# Patient Record
Sex: FEMALE | Race: ASIAN | NOT HISPANIC OR LATINO | URBAN - METROPOLITAN AREA
[De-identification: names, ages, dates, MRNs, and addresses within clinical notes are randomized per-mention and may not be internally consistent; named-entity substitution may affect disease eponyms.]

---

## 2017-05-29 ENCOUNTER — EMERGENCY (EMERGENCY)
Facility: HOSPITAL | Age: 32
LOS: 1 days | Discharge: HOME | End: 2017-05-29
Attending: EMERGENCY MEDICINE | Admitting: EMERGENCY MEDICINE
Payer: COMMERCIAL

## 2017-05-29 VITALS
TEMPERATURE: 98 F | SYSTOLIC BLOOD PRESSURE: 123 MMHG | RESPIRATION RATE: 18 BRPM | DIASTOLIC BLOOD PRESSURE: 81 MMHG | OXYGEN SATURATION: 99 % | HEART RATE: 77 BPM

## 2017-05-29 VITALS
RESPIRATION RATE: 17 BRPM | TEMPERATURE: 98 F | OXYGEN SATURATION: 97 % | HEART RATE: 69 BPM | DIASTOLIC BLOOD PRESSURE: 79 MMHG | SYSTOLIC BLOOD PRESSURE: 117 MMHG

## 2017-05-29 DIAGNOSIS — O20.9 HEMORRHAGE IN EARLY PREGNANCY, UNSPECIFIED: ICD-10-CM

## 2017-05-29 LAB
ALBUMIN SERPL ELPH-MCNC: 4.6 G/DL — SIGNIFICANT CHANGE UP (ref 3.3–5)
ALP SERPL-CCNC: 64 U/L — SIGNIFICANT CHANGE UP (ref 40–120)
ALT FLD-CCNC: 12 U/L RC — SIGNIFICANT CHANGE UP (ref 10–45)
ANION GAP SERPL CALC-SCNC: 14 MMOL/L — SIGNIFICANT CHANGE UP (ref 5–17)
APPEARANCE UR: CLEAR — SIGNIFICANT CHANGE UP
APTT BLD: 30.9 SEC — SIGNIFICANT CHANGE UP (ref 27.5–37.4)
AST SERPL-CCNC: 16 U/L — SIGNIFICANT CHANGE UP (ref 10–40)
BACTERIA # UR AUTO: ABNORMAL /HPF
BASOPHILS # BLD AUTO: 0 K/UL — SIGNIFICANT CHANGE UP (ref 0–0.2)
BASOPHILS NFR BLD AUTO: 0.1 % — SIGNIFICANT CHANGE UP (ref 0–2)
BILIRUB SERPL-MCNC: 0.4 MG/DL — SIGNIFICANT CHANGE UP (ref 0.2–1.2)
BILIRUB UR-MCNC: NEGATIVE — SIGNIFICANT CHANGE UP
BLD GP AB SCN SERPL QL: NEGATIVE — SIGNIFICANT CHANGE UP
BUN SERPL-MCNC: 12 MG/DL — SIGNIFICANT CHANGE UP (ref 7–23)
CALCIUM SERPL-MCNC: 9.6 MG/DL — SIGNIFICANT CHANGE UP (ref 8.4–10.5)
CHLORIDE SERPL-SCNC: 102 MMOL/L — SIGNIFICANT CHANGE UP (ref 96–108)
CO2 SERPL-SCNC: 25 MMOL/L — SIGNIFICANT CHANGE UP (ref 22–31)
COLOR SPEC: YELLOW — SIGNIFICANT CHANGE UP
CREAT SERPL-MCNC: 0.78 MG/DL — SIGNIFICANT CHANGE UP (ref 0.5–1.3)
DIFF PNL FLD: ABNORMAL
EOSINOPHIL # BLD AUTO: 0.1 K/UL — SIGNIFICANT CHANGE UP (ref 0–0.5)
EOSINOPHIL NFR BLD AUTO: 0.7 % — SIGNIFICANT CHANGE UP (ref 0–6)
EPI CELLS # UR: SIGNIFICANT CHANGE UP /HPF
GLUCOSE SERPL-MCNC: 94 MG/DL — SIGNIFICANT CHANGE UP (ref 70–99)
GLUCOSE UR QL: NEGATIVE — SIGNIFICANT CHANGE UP
HCG SERPL-ACNC: 23.2 MIU/ML — SIGNIFICANT CHANGE UP
HCT VFR BLD CALC: 46.1 % — HIGH (ref 34.5–45)
HGB BLD-MCNC: 15.4 G/DL — SIGNIFICANT CHANGE UP (ref 11.5–15.5)
INR BLD: 1.14 RATIO — SIGNIFICANT CHANGE UP (ref 0.88–1.16)
KETONES UR-MCNC: ABNORMAL
LEUKOCYTE ESTERASE UR-ACNC: NEGATIVE — SIGNIFICANT CHANGE UP
LYMPHOCYTES # BLD AUTO: 29.9 % — SIGNIFICANT CHANGE UP (ref 13–44)
LYMPHOCYTES # BLD AUTO: 3.2 K/UL — SIGNIFICANT CHANGE UP (ref 1–3.3)
MCHC RBC-ENTMCNC: 30.3 PG — SIGNIFICANT CHANGE UP (ref 27–34)
MCHC RBC-ENTMCNC: 33.5 GM/DL — SIGNIFICANT CHANGE UP (ref 32–36)
MCV RBC AUTO: 90.5 FL — SIGNIFICANT CHANGE UP (ref 80–100)
MONOCYTES # BLD AUTO: 0.8 K/UL — SIGNIFICANT CHANGE UP (ref 0–0.9)
MONOCYTES NFR BLD AUTO: 6.9 % — SIGNIFICANT CHANGE UP (ref 2–14)
NEUTROPHILS # BLD AUTO: 6.8 K/UL — SIGNIFICANT CHANGE UP (ref 1.8–7.4)
NEUTROPHILS NFR BLD AUTO: 62.4 % — SIGNIFICANT CHANGE UP (ref 43–77)
NITRITE UR-MCNC: NEGATIVE — SIGNIFICANT CHANGE UP
PH UR: 5.5 — SIGNIFICANT CHANGE UP (ref 5–8)
PLATELET # BLD AUTO: 281 K/UL — SIGNIFICANT CHANGE UP (ref 150–400)
POTASSIUM SERPL-MCNC: 4.2 MMOL/L — SIGNIFICANT CHANGE UP (ref 3.5–5.3)
POTASSIUM SERPL-SCNC: 4.2 MMOL/L — SIGNIFICANT CHANGE UP (ref 3.5–5.3)
PROT SERPL-MCNC: 7.8 G/DL — SIGNIFICANT CHANGE UP (ref 6–8.3)
PROT UR-MCNC: SIGNIFICANT CHANGE UP
PROTHROM AB SERPL-ACNC: 12.4 SEC — SIGNIFICANT CHANGE UP (ref 9.8–12.7)
RBC # BLD: 5.09 M/UL — SIGNIFICANT CHANGE UP (ref 3.8–5.2)
RBC # FLD: 11.8 % — SIGNIFICANT CHANGE UP (ref 10.3–14.5)
RBC CASTS # UR COMP ASSIST: SIGNIFICANT CHANGE UP /HPF (ref 0–2)
RH IG SCN BLD-IMP: POSITIVE — SIGNIFICANT CHANGE UP
SODIUM SERPL-SCNC: 141 MMOL/L — SIGNIFICANT CHANGE UP (ref 135–145)
SP GR SPEC: 1.03 — HIGH (ref 1.01–1.02)
UROBILINOGEN FLD QL: NEGATIVE — SIGNIFICANT CHANGE UP
WBC # BLD: 10.9 K/UL — HIGH (ref 3.8–10.5)
WBC # FLD AUTO: 10.9 K/UL — HIGH (ref 3.8–10.5)
WBC UR QL: SIGNIFICANT CHANGE UP /HPF (ref 0–5)

## 2017-05-29 PROCEDURE — 85610 PROTHROMBIN TIME: CPT

## 2017-05-29 PROCEDURE — 76815 OB US LIMITED FETUS(S): CPT

## 2017-05-29 PROCEDURE — 76815 OB US LIMITED FETUS(S): CPT | Mod: 26

## 2017-05-29 PROCEDURE — 81001 URINALYSIS AUTO W/SCOPE: CPT

## 2017-05-29 PROCEDURE — 99284 EMERGENCY DEPT VISIT MOD MDM: CPT | Mod: 25

## 2017-05-29 PROCEDURE — 86850 RBC ANTIBODY SCREEN: CPT

## 2017-05-29 PROCEDURE — 85730 THROMBOPLASTIN TIME PARTIAL: CPT

## 2017-05-29 PROCEDURE — 85027 COMPLETE CBC AUTOMATED: CPT

## 2017-05-29 PROCEDURE — 99285 EMERGENCY DEPT VISIT HI MDM: CPT

## 2017-05-29 PROCEDURE — 76817 TRANSVAGINAL US OBSTETRIC: CPT | Mod: 26

## 2017-05-29 PROCEDURE — 80053 COMPREHEN METABOLIC PANEL: CPT

## 2017-05-29 PROCEDURE — 86901 BLOOD TYPING SEROLOGIC RH(D): CPT

## 2017-05-29 PROCEDURE — 76817 TRANSVAGINAL US OBSTETRIC: CPT

## 2017-05-29 PROCEDURE — 86900 BLOOD TYPING SEROLOGIC ABO: CPT

## 2017-05-29 PROCEDURE — 84702 CHORIONIC GONADOTROPIN TEST: CPT

## 2017-05-29 NOTE — ED PROVIDER NOTE - MEDICAL DECISION MAKING DETAILS
31 year old female,  past medical history septoplasty, presents to the ED for vaginal bleeding for 1 week. Mild abdominal cramping. Check labwork, HCG, transvaginal, UA, reeval.

## 2017-05-29 NOTE — ED PROVIDER NOTE - PROGRESS NOTE DETAILS
Attending MD Cole: Patient re-evaluated and doing well.  No acute issues at  this time.  Lab and radiology tests reviewed with patient and boyfriend.  Patient stable for discharge. Emphasized importance of repeat HCG and U/S in 48hrs.  Explained ectopic vs spontaneous  vs early pregnancy.  Verbalized understanding. Follow up instructions given, importance of follow up emphasized, return to ED parameters reviewed and patient verbalized understanding.  All questions answered, all concerns addressed. Patient reports she will return to Dr. Cowan in 48hrs.

## 2017-05-29 NOTE — ED PROVIDER NOTE - ATTENDING CONTRIBUTION TO CARE
Attending MD Cole: I personally have seen and examined this patient.  Resident note reviewed and agree on plan of care and except where noted.  See below for details.     31F with no reported PMH and PSH septoplasty presents to the ED with vaginal spotting after +ur preg, LMP 4/22/17.  Reports has been having vaginal bleeding with urinary frequency, mild cramping and nausea for about a week.  Reports spotting is light pink and uses a pantyliner a day.  Reports mild abdominal cramping in lower abdomen.  Denies alleviating/aggravating factor. Denies fevers, chills, dizziness, weakness, change in vision. Denies vomiting, diarrhea, blood in stools. Denies dysuria, hematuria, frequency but reports urgency. PMD Dr. Cowan.  On exam, head NCAT, PERRL, FROM at neck, no tenderness to palpation or stepoffs along length of spine, lungs CTAB with good inspiratory effort, +S1S2, no m/r/g, abdomen soft with +BS, NT, ND, no CVAT, pelvic exam scant blood in vaginal vault, os closed, no tenderness to adnexa on bimanual, no CMT, moving all extremities with 5/5 strength bilateral upper and lower extremities, good and equal  strength bilaterally; Plan: Labs, UrHCG, RH, U/S, reassess

## 2017-05-29 NOTE — ED ADULT NURSE NOTE - OBJECTIVE STATEMENT
32 y/o F, A&Ox3, enters ED w/ c/o vaginal bleeding for the past few days. Pt. had an  at age 19, has not had any pregnancies since. LMP about a month ago. Pt. reports spotting of bright pink blood and brown blood. Denies clots. C/o fatigue, nausea and dizziness. Also reports sore throat. Pt. reports she has been around family that is sick. Denies fever/chills. Skin warm, dry & intact. Family at bedside. 30 y/o F, A&Ox3, enters ED w/ c/o vaginal bleeding for the past few days. Pt. had an  at age 19, has not had any pregnancies since. LMP about a month ago. Pt. states she took 2 pregnancy tests and both were positive. Pt. reports spotting of bright pink blood and brown blood. Denies clots. C/o fatigue, nausea and dizziness. Also reports sore throat. Pt. reports she has been around family that is sick. Denies fever/chills. Skin warm, dry & intact. Family at bedside.

## 2017-05-29 NOTE — ED PROVIDER NOTE - NONTENDER LOCATION
left upper quadrant/right costovertebral angle/right upper quadrant/left costovertebral angle/left lower quadrant/right lower quadrant

## 2017-05-29 NOTE — ED PROVIDER NOTE - OBJECTIVE STATEMENT
31 year old female,  past medical history septoplasty, presents to the ED for vaginal bleeding for 1 week. Reports light pink spotting, does not even go through 1 pad a day.  Patient reports that she may be pregnant, positive home pregnancy test. Reports mild cramping, suprapubic, non-radiating, no aggravating or relieving factors. No fevers or chills. Nausea without vomiting.  No dysuria but reports increased frequency.     LMP: 2017  primary medical doctor: Dr. Cowan

## 2017-07-31 ENCOUNTER — EMERGENCY (EMERGENCY)
Facility: HOSPITAL | Age: 32
LOS: 1 days | Discharge: HOME | End: 2017-07-31
Attending: EMERGENCY MEDICINE | Admitting: EMERGENCY MEDICINE
Payer: COMMERCIAL

## 2017-07-31 VITALS
HEART RATE: 64 BPM | SYSTOLIC BLOOD PRESSURE: 120 MMHG | RESPIRATION RATE: 20 BRPM | DIASTOLIC BLOOD PRESSURE: 85 MMHG | OXYGEN SATURATION: 100 % | TEMPERATURE: 98 F

## 2017-07-31 DIAGNOSIS — Z98.890 OTHER SPECIFIED POSTPROCEDURAL STATES: Chronic | ICD-10-CM

## 2017-07-31 PROCEDURE — 99283 EMERGENCY DEPT VISIT LOW MDM: CPT

## 2017-07-31 PROCEDURE — 99283 EMERGENCY DEPT VISIT LOW MDM: CPT | Mod: 25

## 2017-07-31 PROCEDURE — 73562 X-RAY EXAM OF KNEE 3: CPT | Mod: 26,LT

## 2017-07-31 PROCEDURE — 73562 X-RAY EXAM OF KNEE 3: CPT

## 2017-07-31 NOTE — ED PROVIDER NOTE - MEDICAL DECISION MAKING DETAILS
32 y/o F w/ pmh p/w increasing L knee pain and joint instability s/p fall. 32 y/o F w/ pmh p/w increasing L knee pain and joint instability s/p fall.  Att yo female presents with left knee pain x 3 days s/p mechanical fall; taking ibuprofen at home; no fevers; no cp/sob; on exam left lateral knee ttp; no neurovascular deficits; xray negative for fracture; pt has her own knee immobilizer which she reapplied; will f/u with ortho as outpatient; nsaids for pain

## 2017-07-31 NOTE — ED PROVIDER NOTE - ATTENDING CONTRIBUTION TO CARE
Att yo female presents with left knee pain x 3 days s/p mechanical fall; taking ibuprofen at home; no fevers; no cp/sob; on exam left lateral knee ttp; no neurovascular deficits; xray negative for fracture; pt has her own knee immobilizer which she reapplied; will f/u with ortho as outpatient; nsaids for pain

## 2017-07-31 NOTE — ED PROVIDER NOTE - OBJECTIVE STATEMENT
30 yo F w/out pmh p/w L knee pain and inability to ambulate s/p mechanical fall 3 days ago. L foot got caught, pt fell onto buttocks. Pt went to [facility] ED immediately after (got XR), was referred to ortho clinic, but was unable to get appointment. Pain is "internal" L knee, w/ pain radiating up L thigh. Pain has increased from 5/10 3 days ago, to 9/10 today. Pain is mildly decreased w/ ibuprofen 600. Pain increases on wt bearing. 30 yo F w/out pmh p/w L knee pain and inability to ambulate s/p mechanical fall 3 days ago. L foot got caught, pt fell onto buttocks, reports 'feeling' L knee lateral ligament tear. Pt went to [facility] ED immediately after (got XR that was read as normal), was referred to ortho clinic, but was unable to get appointment. Pain is "internal" L knee, w/ pain radiating up L thigh. Pain has increased from 5/10 3 days ago, to 9/10 today. Pain is mildly decreased w/ ibuprofen 600. Pain increases on wt bearing.

## 2017-08-04 ENCOUNTER — APPOINTMENT (OUTPATIENT)
Dept: ORTHOPEDIC SURGERY | Facility: CLINIC | Age: 32
End: 2017-08-04
Payer: COMMERCIAL

## 2017-08-04 VITALS — SYSTOLIC BLOOD PRESSURE: 106 MMHG | DIASTOLIC BLOOD PRESSURE: 71 MMHG | HEART RATE: 64 BPM

## 2017-08-04 VITALS — BODY MASS INDEX: 25.71 KG/M2 | WEIGHT: 160 LBS | HEIGHT: 66 IN

## 2017-08-04 DIAGNOSIS — Z78.9 OTHER SPECIFIED HEALTH STATUS: ICD-10-CM

## 2017-08-04 DIAGNOSIS — S83.8X2A SPRAIN OF OTHER SPECIFIED PARTS OF LEFT KNEE, INITIAL ENCOUNTER: ICD-10-CM

## 2017-08-04 DIAGNOSIS — Z87.891 PERSONAL HISTORY OF NICOTINE DEPENDENCE: ICD-10-CM

## 2017-08-04 DIAGNOSIS — Z56.0 UNEMPLOYMENT, UNSPECIFIED: ICD-10-CM

## 2017-08-04 PROCEDURE — 99204 OFFICE O/P NEW MOD 45 MIN: CPT

## 2017-08-04 PROCEDURE — 73562 X-RAY EXAM OF KNEE 3: CPT | Mod: LT

## 2017-08-04 SDOH — ECONOMIC STABILITY - INCOME SECURITY: UNEMPLOYMENT, UNSPECIFIED: Z56.0

## 2017-08-08 PROBLEM — Z78.9 OCCASIONAL RECREATIONAL DRUG USE: Status: ACTIVE | Noted: 2017-08-04

## 2017-08-08 PROBLEM — Z87.891 FORMER SMOKER: Status: ACTIVE | Noted: 2017-08-04

## 2017-08-08 PROBLEM — Z78.9 EXERCISES OCCASIONALLY: Status: ACTIVE | Noted: 2017-08-04

## 2017-08-08 PROBLEM — Z56.0 UNEMPLOYED: Status: ACTIVE | Noted: 2017-08-04

## 2017-08-11 ENCOUNTER — APPOINTMENT (OUTPATIENT)
Dept: ORTHOPEDIC SURGERY | Facility: CLINIC | Age: 32
End: 2017-08-11

## 2018-02-01 ENCOUNTER — OUTPATIENT (OUTPATIENT)
Dept: OUTPATIENT SERVICES | Facility: HOSPITAL | Age: 33
LOS: 1 days | End: 2018-02-01
Payer: MEDICAID

## 2018-02-01 DIAGNOSIS — Z98.890 OTHER SPECIFIED POSTPROCEDURAL STATES: Chronic | ICD-10-CM

## 2018-02-01 PROCEDURE — G9001: CPT

## 2018-02-15 ENCOUNTER — EMERGENCY (EMERGENCY)
Facility: HOSPITAL | Age: 33
LOS: 1 days | Discharge: HOME | End: 2018-02-15
Attending: EMERGENCY MEDICINE | Admitting: EMERGENCY MEDICINE
Payer: MEDICAID

## 2018-02-15 VITALS
HEART RATE: 78 BPM | DIASTOLIC BLOOD PRESSURE: 78 MMHG | RESPIRATION RATE: 16 BRPM | OXYGEN SATURATION: 99 % | SYSTOLIC BLOOD PRESSURE: 118 MMHG | TEMPERATURE: 98 F

## 2018-02-15 DIAGNOSIS — Z98.890 OTHER SPECIFIED POSTPROCEDURAL STATES: Chronic | ICD-10-CM

## 2018-02-15 PROCEDURE — 99053 MED SERV 10PM-8AM 24 HR FAC: CPT

## 2018-02-15 PROCEDURE — 99284 EMERGENCY DEPT VISIT MOD MDM: CPT | Mod: 25

## 2018-02-15 NOTE — ED ADULT NURSE NOTE - OBJECTIVE STATEMENT
33 y/o female presents to the ED ambulatory with steady gait. A&Ox3. C/O lower abdominal pain "cramping" starting today. Pt. states confirmed pregnancy with home pregnancy test x3 times. Pt was seen in the ED in may for spontaneous miscarriage. Pt had surgical  when she was 20y/o. G3PO. Pt denies vaginal bleeding at this time. +easy work of breathing, clear lung sounds. abdomen soft, nontender, and nondistended. full range of motion of all extremities. no edema present. LMP . Pt. states she started oral contraceptives in December and discontinued in January. Pt denies cough, chills, fever, chest pain, n/v/d, or numbness and tingling.

## 2018-02-16 VITALS
DIASTOLIC BLOOD PRESSURE: 78 MMHG | RESPIRATION RATE: 16 BRPM | HEART RATE: 72 BPM | OXYGEN SATURATION: 98 % | SYSTOLIC BLOOD PRESSURE: 108 MMHG

## 2018-02-16 DIAGNOSIS — Z34.90 ENCOUNTER FOR SUPERVISION OF NORMAL PREGNANCY, UNSPECIFIED, UNSPECIFIED TRIMESTER: ICD-10-CM

## 2018-02-16 LAB
ALBUMIN SERPL ELPH-MCNC: 3.8 G/DL — SIGNIFICANT CHANGE UP (ref 3.3–5)
ALP SERPL-CCNC: 50 U/L — SIGNIFICANT CHANGE UP (ref 40–120)
ALT FLD-CCNC: 14 U/L RC — SIGNIFICANT CHANGE UP (ref 10–45)
ANION GAP SERPL CALC-SCNC: 14 MMOL/L — SIGNIFICANT CHANGE UP (ref 5–17)
APPEARANCE UR: CLEAR — SIGNIFICANT CHANGE UP
APTT BLD: 28.1 SEC — SIGNIFICANT CHANGE UP (ref 27.5–37.4)
AST SERPL-CCNC: 16 U/L — SIGNIFICANT CHANGE UP (ref 10–40)
BASOPHILS # BLD AUTO: 0.1 K/UL — SIGNIFICANT CHANGE UP (ref 0–0.2)
BASOPHILS NFR BLD AUTO: 0.6 % — SIGNIFICANT CHANGE UP (ref 0–2)
BILIRUB SERPL-MCNC: 0.2 MG/DL — SIGNIFICANT CHANGE UP (ref 0.2–1.2)
BILIRUB UR-MCNC: NEGATIVE — SIGNIFICANT CHANGE UP
BLD GP AB SCN SERPL QL: NEGATIVE — SIGNIFICANT CHANGE UP
BUN SERPL-MCNC: 12 MG/DL — SIGNIFICANT CHANGE UP (ref 7–23)
CALCIUM SERPL-MCNC: 9.2 MG/DL — SIGNIFICANT CHANGE UP (ref 8.4–10.5)
CHLORIDE SERPL-SCNC: 102 MMOL/L — SIGNIFICANT CHANGE UP (ref 96–108)
CO2 SERPL-SCNC: 22 MMOL/L — SIGNIFICANT CHANGE UP (ref 22–31)
COLOR SPEC: YELLOW — SIGNIFICANT CHANGE UP
CREAT SERPL-MCNC: 0.62 MG/DL — SIGNIFICANT CHANGE UP (ref 0.5–1.3)
DIFF PNL FLD: NEGATIVE — SIGNIFICANT CHANGE UP
EOSINOPHIL # BLD AUTO: 0.2 K/UL — SIGNIFICANT CHANGE UP (ref 0–0.5)
EOSINOPHIL NFR BLD AUTO: 1.8 % — SIGNIFICANT CHANGE UP (ref 0–6)
EPI CELLS # UR: SIGNIFICANT CHANGE UP /HPF
GLUCOSE SERPL-MCNC: 92 MG/DL — SIGNIFICANT CHANGE UP (ref 70–99)
GLUCOSE UR QL: NEGATIVE — SIGNIFICANT CHANGE UP
HCG SERPL-ACNC: 810.4 MIU/ML — HIGH (ref 5–24)
HCT VFR BLD CALC: 40.8 % — SIGNIFICANT CHANGE UP (ref 34.5–45)
HGB BLD-MCNC: 13.7 G/DL — SIGNIFICANT CHANGE UP (ref 11.5–15.5)
INR BLD: 0.99 RATIO — SIGNIFICANT CHANGE UP (ref 0.88–1.16)
KETONES UR-MCNC: NEGATIVE — SIGNIFICANT CHANGE UP
LEUKOCYTE ESTERASE UR-ACNC: NEGATIVE — SIGNIFICANT CHANGE UP
LYMPHOCYTES # BLD AUTO: 33.9 % — SIGNIFICANT CHANGE UP (ref 13–44)
LYMPHOCYTES # BLD AUTO: 4 K/UL — HIGH (ref 1–3.3)
MCHC RBC-ENTMCNC: 29.9 PG — SIGNIFICANT CHANGE UP (ref 27–34)
MCHC RBC-ENTMCNC: 33.5 GM/DL — SIGNIFICANT CHANGE UP (ref 32–36)
MCV RBC AUTO: 89.4 FL — SIGNIFICANT CHANGE UP (ref 80–100)
MONOCYTES # BLD AUTO: 0.8 K/UL — SIGNIFICANT CHANGE UP (ref 0–0.9)
MONOCYTES NFR BLD AUTO: 6.5 % — SIGNIFICANT CHANGE UP (ref 2–14)
NEUTROPHILS # BLD AUTO: 6.7 K/UL — SIGNIFICANT CHANGE UP (ref 1.8–7.4)
NEUTROPHILS NFR BLD AUTO: 57.2 % — SIGNIFICANT CHANGE UP (ref 43–77)
NITRITE UR-MCNC: NEGATIVE — SIGNIFICANT CHANGE UP
PH UR: 6 — SIGNIFICANT CHANGE UP (ref 5–8)
PLATELET # BLD AUTO: 285 K/UL — SIGNIFICANT CHANGE UP (ref 150–400)
POTASSIUM SERPL-MCNC: 4 MMOL/L — SIGNIFICANT CHANGE UP (ref 3.5–5.3)
POTASSIUM SERPL-SCNC: 4 MMOL/L — SIGNIFICANT CHANGE UP (ref 3.5–5.3)
PROT SERPL-MCNC: 7 G/DL — SIGNIFICANT CHANGE UP (ref 6–8.3)
PROT UR-MCNC: NEGATIVE — SIGNIFICANT CHANGE UP
PROTHROM AB SERPL-ACNC: 10.8 SEC — SIGNIFICANT CHANGE UP (ref 9.8–12.7)
RBC # BLD: 4.56 M/UL — SIGNIFICANT CHANGE UP (ref 3.8–5.2)
RBC # FLD: 11.6 % — SIGNIFICANT CHANGE UP (ref 10.3–14.5)
RBC CASTS # UR COMP ASSIST: SIGNIFICANT CHANGE UP /HPF (ref 0–2)
RH IG SCN BLD-IMP: POSITIVE — SIGNIFICANT CHANGE UP
SODIUM SERPL-SCNC: 138 MMOL/L — SIGNIFICANT CHANGE UP (ref 135–145)
SP GR SPEC: 1.02 — SIGNIFICANT CHANGE UP (ref 1.01–1.02)
UROBILINOGEN FLD QL: NEGATIVE — SIGNIFICANT CHANGE UP
WBC # BLD: 11.7 K/UL — HIGH (ref 3.8–10.5)
WBC # FLD AUTO: 11.7 K/UL — HIGH (ref 3.8–10.5)
WBC UR QL: SIGNIFICANT CHANGE UP /HPF (ref 0–5)

## 2018-02-16 PROCEDURE — 76815 OB US LIMITED FETUS(S): CPT | Mod: 26

## 2018-02-16 PROCEDURE — 85610 PROTHROMBIN TIME: CPT

## 2018-02-16 PROCEDURE — 80053 COMPREHEN METABOLIC PANEL: CPT

## 2018-02-16 PROCEDURE — 99283 EMERGENCY DEPT VISIT LOW MDM: CPT

## 2018-02-16 PROCEDURE — 86850 RBC ANTIBODY SCREEN: CPT

## 2018-02-16 PROCEDURE — 85027 COMPLETE CBC AUTOMATED: CPT

## 2018-02-16 PROCEDURE — 85730 THROMBOPLASTIN TIME PARTIAL: CPT

## 2018-02-16 PROCEDURE — 86901 BLOOD TYPING SEROLOGIC RH(D): CPT

## 2018-02-16 PROCEDURE — 76802 OB US < 14 WKS ADDL FETUS: CPT

## 2018-02-16 PROCEDURE — 87086 URINE CULTURE/COLONY COUNT: CPT

## 2018-02-16 PROCEDURE — 81001 URINALYSIS AUTO W/SCOPE: CPT

## 2018-02-16 PROCEDURE — 84702 CHORIONIC GONADOTROPIN TEST: CPT

## 2018-02-16 PROCEDURE — 86900 BLOOD TYPING SEROLOGIC ABO: CPT

## 2018-02-16 NOTE — ED PROVIDER NOTE - ATTENDING CONTRIBUTION TO CARE
32 y.o. female  at approx 4 weeks by dates pw lower ab cramping with abd soft, nt.  threatened ab work up.  eating soup on arrival, abd soft, nt but h/o miscarriage.

## 2018-02-16 NOTE — ED PROVIDER NOTE - MEDICAL DECISION MAKING DETAILS
32 y.o. female  at approx 4 weeks by dates pw lower ab cramping and vaginal spotting. Will check labs, UA, type and screen, US, HCG, reevaluate.

## 2018-02-16 NOTE — ED PROVIDER NOTE - PHYSICAL EXAMINATION
Ongoing issue. Patient reports 100% compliance with amlodipine 5 mg, losartan 50 mg and hydrochlorothiazide 12.5 mg. Patient denies any issues with headache, dizziness, chest pain.       Gen: Well appearing, NAD  Head: NCAT  HEENT: MMM, Normal conjunctiva  Lung: CTAB, no rales, rhonchi or wheezing  CV: RRR, no murmurs, rubs or gallops  Abd: soft, +lower ab tenderness, no rebound or guarding  MSK: No CVA tenderness. No edema, no visible deformities  Neuro: No focal neurologic deficits.   Skin: Warm and dry, no evidence of rash  Psych: normal mood and affect, A&Ox3

## 2018-02-16 NOTE — CONSULT NOTE ADULT - SUBJECTIVE AND OBJECTIVE BOX
HPI:    32y , LMP 2018      presents with       OBHx:  GynHx:   PMH:  PSH:  All:  Meds:   SocialHx:     Vital Signs Last 24 Hrs  T(C): 36.8 (2018 00:52), Max: 36.8 (2018 00:52)  T(F): 98.2 (2018 00:52), Max: 98.2 (2018 00:52)  HR: 74 (2018 00:52) (74 - 78)  BP: 110/76 (2018 00:52) (110/76 - 118/78)  BP(mean): --  RR: 16 (2018 00:52) (16 - 16)  SpO2: 96% (2018 00:52) (96% - 99%)    PE:  Gen: Comfortable, NAD  CV: RRR  Pulm: CTAB  Abd: Soft, NT  Ext: No edema or tenderness bilaterally  Spec Exam:    LABS:                        13.7   11.7  )-----------( 285      ( 2018 02:25 )             40.8     02-    138  |  102  |  12  ----------------------------<  92  4.0   |  22  |  0.62    Ca    9.2      2018 02:25    TPro  7.0  /  Alb  3.8  /  TBili  0.2  /  DBili  x   /  AST  16  /  ALT  14  /  AlkPhos  50  02-16    PT/INR - ( 2018 02:25 )   PT: 10.8 sec;   INR: 0.99 ratio         PTT - ( 2018 02:25 )  PTT:28.1 sec  Urinalysis Basic - ( 2018 02:29 )    Color: Yellow / Appearance: Clear / S.020 / pH: x  Gluc: x / Ketone: Negative  / Bili: Negative / Urobili: Negative   Blood: x / Protein: Negative / Nitrite: Negative   Leuk Esterase: Negative / RBC: 0-2 /HPF / WBC 3-5 /HPF   Sq Epi: x / Non Sq Epi: OCC /HPF / Bacteria: x        RADIOLOGY & ADDITIONAL STUDIES:      A/P: 32y G P   who present with  -  -      Ashley Alfonso PGY2 HPI:    32y  at 6 0/7 by LMP 2018  presents with pelvic cramping for one day. Patient took a home pregnancy test on saturday and found out she was pregnant. Today she began to have pelvic cramping and was concerned about having a miscarriage and came in to be evaluated. Denies vaginal bleeding, N/V, fevers, chills. Patient has not been seen this pregnancy. Gyn is in [place].       OBHx: topx1, sabx1  GynHx: regular menses, denies hx of fibroids, ovarian cysts, abnormal paps  PMH: denies   PSH: tonsillectomy  All: NKDA  Meds: none      Vital Signs Last 24 Hrs  T(C): 36.8 (2018 00:52), Max: 36.8 (2018 00:52)  T(F): 98.2 (2018 00:52), Max: 98.2 (2018 00:52)  HR: 74 (2018 00:52) (74 - 78)  BP: 110/76 (2018 00:52) (110/76 - 118/78)  BP(mean): --  RR: 16 (2018 00:52) (16 - 16)  SpO2: 96% (2018 00:52) (96% - 99%)    PE:  Gen: Comfortable, NAD  Abd: Soft, NT, nondistended   Ext: No edema or tenderness bilaterally  Spec Exam: deferred    LABS:                        13.7   11.7  )-----------( 285      ( 2018 02:25 )             40.8     02-    138  |  102  |  12  ----------------------------<  92  4.0   |  22  |  0.62    Ca    9.2      2018 02:25    TPro  7.0  /  Alb  3.8  /  TBili  0.2  /  DBili  x   /  AST  16  /  ALT  14  /  AlkPhos  50  02-16    PT/INR - ( 2018 02:25 )   PT: 10.8 sec;   INR: 0.99 ratio         PTT - ( 2018 02:25 )  PTT:28.1 sec  Urinalysis Basic - ( 2018 02:29 )    Color: Yellow / Appearance: Clear / S.020 / pH: x  Gluc: x / Ketone: Negative  / Bili: Negative / Urobili: Negative   Blood: x / Protein: Negative / Nitrite: Negative   Leuk Esterase: Negative / RBC: 0-2 /HPF / WBC 3-5 /HPF   Sq Epi: x / Non Sq Epi: OCC /HPF / Bacteria: x        RADIOLOGY & ADDITIONAL STUDIES:      EXAM: US OB LES THAN 14WK EA ADD GES       PROCEDURE DATE: 2018           INTERPRETATION: CLINICAL INFORMATION: 32-year-old pregnant female presents   of abdominal pain and vaginal bleeding. Beta-hCG today is 810.     LMP: 2018.     COMPARISON: None available.     TECHNIQUE: Transabdominal and Endovaginal pelvic sonogram.     FINDINGS:     Uterus: 7.5 x 4.6 x 4.5 cm. Within normal limits.     Endometrium: 1.3 mm. There is a 3 mm cystic structure within the endometrial   cavity. No fetal pole or yolk sac identified.     Right ovary: 2.9 x 1.7 x 1.4 cm. Within normal limits.     Left ovary: 4.4 x 2.5 x 2.5 cm. There is a 2.5 x 1.5 x 1.6 cm cyst   containing minimal debris.     Free fluid: Trace.     IMPRESSION:     3 mm cystic structure within the endometrial cavity without intrauterine   fetal pole. Differential includes early pregnancy, missed  or   ectopic pregnancy. Continued follow-up with serial beta-hCG levels and   ultrasound is recommended. 2.5 cm left ovarian minimally complex cyst,   likely corpus luteum cyst.

## 2018-02-16 NOTE — ED PROVIDER NOTE - PROGRESS NOTE DETAILS
Seen by OB to place on Beta list. Recommend returning to ED on Sunday for repeat hcg and US. Guy Hernandez DO

## 2018-02-16 NOTE — ED PROVIDER NOTE - OBJECTIVE STATEMENT
32 y.o. female  at approx 4 weeks by dates, LMP beginning of January, +home pregnancy test, does not have OB currently, pw lower ab cramping for past 1 week, also with vaginal spotting. Pt was concerned with possible miscarriage and came to ED. Pt denies nausea, vomiting, fevers, back pain, dysuria or hematuria.

## 2018-02-16 NOTE — CONSULT NOTE ADULT - PROBLEM SELECTOR RECOMMENDATION 9
- patient to return to return to ED on 2/17 in the evening for repeat bHCG  - patient given ectopic and bleeding precautions    RICARDO Alfonso PGY-2 - patient to return to return to ED on 2/17 in the evening for repeat bHCG  - patient given ectopic and bleeding precautions    d/w Dr. Ernesto Alfonso PGY-2

## 2018-02-17 LAB
CULTURE RESULTS: SIGNIFICANT CHANGE UP
SPECIMEN SOURCE: SIGNIFICANT CHANGE UP

## 2018-02-18 RX ORDER — CEPHALEXIN 500 MG
1 CAPSULE ORAL
Qty: 14 | Refills: 0
Start: 2018-02-18 | End: 2018-02-24

## 2018-02-18 NOTE — ED POST DISCHARGE NOTE - OTHER COMMUNICATION
Spoke with pt, still with some cramps, no dysurai, no frequency, fevers, or chills.  Placed on keflex and advised close follow yup with OB to repeat HCG and urine testing.  Sukumar

## 2018-02-19 NOTE — CHART NOTE - NSCHARTNOTEFT_GEN_A_CORE
R2 OBGYN Update    Patient no show to ED on 2/18 for follow up b-hcg.  Called patient to inform her to follow up her b-hcg.  No answer.  Message left to inform patient to come to [facility] ED.  Will try patient again tomorrow.  If no answer tomorrow will send certified letter    Marion Matt PGY-2

## 2018-02-22 ENCOUNTER — EMERGENCY (EMERGENCY)
Facility: HOSPITAL | Age: 33
LOS: 1 days | Discharge: HOME | End: 2018-02-22
Attending: EMERGENCY MEDICINE | Admitting: EMERGENCY MEDICINE
Payer: MEDICAID

## 2018-02-22 VITALS
HEART RATE: 75 BPM | DIASTOLIC BLOOD PRESSURE: 69 MMHG | SYSTOLIC BLOOD PRESSURE: 107 MMHG | RESPIRATION RATE: 20 BRPM | OXYGEN SATURATION: 100 % | TEMPERATURE: 98 F

## 2018-02-22 DIAGNOSIS — Z98.890 OTHER SPECIFIED POSTPROCEDURAL STATES: Chronic | ICD-10-CM

## 2018-02-22 LAB
ALBUMIN SERPL ELPH-MCNC: 3.9 G/DL — SIGNIFICANT CHANGE UP (ref 3.3–5)
ALP SERPL-CCNC: 58 U/L — SIGNIFICANT CHANGE UP (ref 40–120)
ALT FLD-CCNC: 13 U/L RC — SIGNIFICANT CHANGE UP (ref 10–45)
ANION GAP SERPL CALC-SCNC: 14 MMOL/L — SIGNIFICANT CHANGE UP (ref 5–17)
AST SERPL-CCNC: 16 U/L — SIGNIFICANT CHANGE UP (ref 10–40)
BASOPHILS # BLD AUTO: 0 K/UL — SIGNIFICANT CHANGE UP (ref 0–0.2)
BASOPHILS NFR BLD AUTO: 0.4 % — SIGNIFICANT CHANGE UP (ref 0–2)
BILIRUB SERPL-MCNC: 0.2 MG/DL — SIGNIFICANT CHANGE UP (ref 0.2–1.2)
BLD GP AB SCN SERPL QL: NEGATIVE — SIGNIFICANT CHANGE UP
BUN SERPL-MCNC: 16 MG/DL — SIGNIFICANT CHANGE UP (ref 7–23)
CALCIUM SERPL-MCNC: 9.3 MG/DL — SIGNIFICANT CHANGE UP (ref 8.4–10.5)
CHLORIDE SERPL-SCNC: 104 MMOL/L — SIGNIFICANT CHANGE UP (ref 96–108)
CO2 SERPL-SCNC: 22 MMOL/L — SIGNIFICANT CHANGE UP (ref 22–31)
CREAT SERPL-MCNC: 0.76 MG/DL — SIGNIFICANT CHANGE UP (ref 0.5–1.3)
EOSINOPHIL # BLD AUTO: 0.1 K/UL — SIGNIFICANT CHANGE UP (ref 0–0.5)
EOSINOPHIL NFR BLD AUTO: 0.9 % — SIGNIFICANT CHANGE UP (ref 0–6)
GLUCOSE SERPL-MCNC: 89 MG/DL — SIGNIFICANT CHANGE UP (ref 70–99)
HCG SERPL-ACNC: 7505 MIU/ML — HIGH (ref 5–24)
HCT VFR BLD CALC: 42.1 % — SIGNIFICANT CHANGE UP (ref 34.5–45)
HGB BLD-MCNC: 14.5 G/DL — SIGNIFICANT CHANGE UP (ref 11.5–15.5)
LYMPHOCYTES # BLD AUTO: 26.3 % — SIGNIFICANT CHANGE UP (ref 13–44)
LYMPHOCYTES # BLD AUTO: 3.2 K/UL — SIGNIFICANT CHANGE UP (ref 1–3.3)
MCHC RBC-ENTMCNC: 30.9 PG — SIGNIFICANT CHANGE UP (ref 27–34)
MCHC RBC-ENTMCNC: 34.5 GM/DL — SIGNIFICANT CHANGE UP (ref 32–36)
MCV RBC AUTO: 89.4 FL — SIGNIFICANT CHANGE UP (ref 80–100)
MONOCYTES # BLD AUTO: 0.8 K/UL — SIGNIFICANT CHANGE UP (ref 0–0.9)
MONOCYTES NFR BLD AUTO: 6.4 % — SIGNIFICANT CHANGE UP (ref 2–14)
NEUTROPHILS # BLD AUTO: 7.9 K/UL — HIGH (ref 1.8–7.4)
NEUTROPHILS NFR BLD AUTO: 66 % — SIGNIFICANT CHANGE UP (ref 43–77)
PLATELET # BLD AUTO: 316 K/UL — SIGNIFICANT CHANGE UP (ref 150–400)
POTASSIUM SERPL-MCNC: 4.4 MMOL/L — SIGNIFICANT CHANGE UP (ref 3.5–5.3)
POTASSIUM SERPL-SCNC: 4.4 MMOL/L — SIGNIFICANT CHANGE UP (ref 3.5–5.3)
PROT SERPL-MCNC: 7.4 G/DL — SIGNIFICANT CHANGE UP (ref 6–8.3)
RBC # BLD: 4.71 M/UL — SIGNIFICANT CHANGE UP (ref 3.8–5.2)
RBC # FLD: 11.5 % — SIGNIFICANT CHANGE UP (ref 10.3–14.5)
RH IG SCN BLD-IMP: POSITIVE — SIGNIFICANT CHANGE UP
SODIUM SERPL-SCNC: 140 MMOL/L — SIGNIFICANT CHANGE UP (ref 135–145)
WBC # BLD: 12 K/UL — HIGH (ref 3.8–10.5)
WBC # FLD AUTO: 12 K/UL — HIGH (ref 3.8–10.5)

## 2018-02-22 PROCEDURE — 99284 EMERGENCY DEPT VISIT MOD MDM: CPT | Mod: 25

## 2018-02-22 PROCEDURE — 86850 RBC ANTIBODY SCREEN: CPT

## 2018-02-22 PROCEDURE — 86900 BLOOD TYPING SEROLOGIC ABO: CPT

## 2018-02-22 PROCEDURE — 85027 COMPLETE CBC AUTOMATED: CPT

## 2018-02-22 PROCEDURE — 87086 URINE CULTURE/COLONY COUNT: CPT

## 2018-02-22 PROCEDURE — 81001 URINALYSIS AUTO W/SCOPE: CPT

## 2018-02-22 PROCEDURE — 86901 BLOOD TYPING SEROLOGIC RH(D): CPT

## 2018-02-22 PROCEDURE — 80053 COMPREHEN METABOLIC PANEL: CPT

## 2018-02-22 PROCEDURE — 84702 CHORIONIC GONADOTROPIN TEST: CPT

## 2018-02-22 PROCEDURE — 99285 EMERGENCY DEPT VISIT HI MDM: CPT

## 2018-02-22 PROCEDURE — 76817 TRANSVAGINAL US OBSTETRIC: CPT

## 2018-02-22 NOTE — ED PROVIDER NOTE - MEDICAL DECISION MAKING DETAILS
Patient with slight discharge, cramping, + home pregnancy test with pregnancy of unknown location  will get iv, labs, t&s and tvus to r/o subchorionic hemorrhage/ectopic and test for occult STI with urine.  Will follow up on labs, analgesia, reassess and disposition as clinically indicated.

## 2018-02-22 NOTE — ED PROVIDER NOTE - PROGRESS NOTE DETAILS
Bess Gaytan MD - Attending Physician: US with intrauterine gestational sac, no adnexal lesions, likely early pregnancy. Need for close early re-ultrasound to assess for growth and appropriate pregnancy. D/w patient and family results, need for close follow-up, and strict return precautions

## 2018-02-22 NOTE — ED ADULT TRIAGE NOTE - CHIEF COMPLAINT QUOTE
possible ectopic pregnancy.  Pt was here last Thursday- HCG level showing ectopic.  Pt received a call from ER.

## 2018-02-22 NOTE — ED ADULT NURSE NOTE - OBJECTIVE STATEMENT
32y female presents to the ER c/o abdominal cramping. pt is alert and oriented x 4 and speaking coherently, pmh of depression. pt in NAD. states she is here from gyn for possible ectopic pregnancy. pt has positive hcg, no visualized in utero pregnancy. LMP jan 5th. pt states abdominal cramping started last week. significant other at the bedside, pt denies cp, sob, fevers, chills, n/v/d. pending md wagoner. VSS. will reassess.

## 2018-02-22 NOTE — ED PROVIDER NOTE - NS_ ATTENDINGSCRIBEDETAILS _ED_A_ED_FT
Williams Horne MD note: The scribe's documentation has been prepared under my direction and personally reviewed by me.  I confirm that the note above accurately reflects my work, treatment, procedures, and medical decision making.

## 2018-02-22 NOTE — ED PROVIDER NOTE - OBJECTIVE STATEMENT
32y Female complaining of cramping for one day, G3, P0 , A2. Positive home pregnancy test. Normal menstrual cycle was the first week of January. In May had a miscellaneous . This is a desired pregnancy. No urinating symptoms, no allergies, no ETOH, No Smoking.

## 2018-02-22 NOTE — ED PROVIDER NOTE - PHYSICAL EXAMINATION
GEN: NAD, awake, eyes open spontaneously  HEENT: NCAT, MMM, Trachea midline, normal conjunctiva, perrl  CHEST/LUNGS: Non-tachypneic, CTAB, bilateral breath sounds  CARDIAC: Non-tachycardic, normal perfusion  ABDOMEN: Soft, NTND, No rebound/guarding  : os is closed, no vaginal bleeding, scant white to mild yellow discharge, no cmt,   MSK: No edema, no gross deformity of extremities  SKIN: No rashes, no petechiae, no vesicles  NEURO: CN grossly intact, no focal motor or sensory deficits  PSYCH: Alert, appropriate, cooperative, with capacity and insight

## 2018-02-22 NOTE — ED PROVIDER NOTE - SHIFT CHANGE DETAILS
Bess Gaytan MD - Attending Physician: Pt here with r/o ectopic for repeat Bhcg and US. HCG today 7505, awaiting US read for dispo

## 2018-02-22 NOTE — ED ADULT NURSE NOTE - NS ED NURSE DC PT EDUCATION RESOURCES
ER Documentation


Chief Complaint


Chief Complaint


vomiting since last night , rt side headcahe





HPI


40-year-old female who presents emergency department for right-sided headache 

that started gradually with unknown specific time and date of onset, vomiting 

since last night.  Stated that she has this kind of headache before when she 

has her migraine attacks.  Also complains of nasal congestion, throat pain.  

Also added that she usually gets some IV fluids, Reglan, Benadryl for her 

migraine attacks.  Denies head trauma, that this is the worst headache of her 

life, neck pain, neck stiffness, difficulty swallowing, shoulder pain, chest 

pain, back pain, abdominal pain, constipation, diarrhea, loss of bowel and 

bladder control, urinary symptoms, pregnancy or possibility of being pregnant, 

trauma, injury, falls, recent long travel, recent travel, difficulty breathing 

when lying flat, recent antibiotic use in the last 3 months, recent exposure to 

any illness, difficulty walking, numbness or tingling sensation.





Stated that she has past medical history of anemia, migraine.  Surgical history 

of breast implants.





ROS


All systems reviewed and are negative except as per history of present illness.





Medications


Home Meds


Active Scripts


Meclizine Hcl* (Antivert*) 12.5 Mg Tab, 12.5 MG PO Q6H Y for DIZZINESS, #20 TAB


   Prov:JARROD MARQUEZ         12/16/17


Diphenhydramine Hcl* (Benadryl*) 25 Mg Cap, 25 MG PO Q8 Y for ITCHING/RASH, #30 

TAB


   Prov:JARROD MARQUEZ         12/16/17


Metoclopramide* (Reglan*) 10 Mg Tablet, 10 MG PO Q8 Y for NAUSEA AND/OR VOMITING

, #20 TAB


   Prov:PASILAJARROD ESTRELLA         12/16/17


Ibuprofen* (Motrin*) 800 Mg Tab, 800 MG PO Q8 Y for PAIN AND OR ELEVATED TEMP, #

20 TAB


   Prov:JARROD MARQUEZ          12/16/17


Acetaminophen* (Tylophen*) 500 Mg Capsule, 1 CAP PO Q6H Y for PAIN AND OR 

ELEVATED TEMP, #20 CAP


   Prov:JARROD MARQUEZ          12/16/17


Azithromycin* (Zithromax*) 250 Mg Tablet, 250 MG PO .ZPACK AS DIRECTED, #6 TAB


   TAKE 500 MG (2 TABS) THE FIRST DAY THEN 250 MG (1 TAB) DAYS 2-5


   Prov:JARROD MARQUEZ         12/16/17


Ciprofloxacin Hcl* (Ciprofloxacin Hcl*) 500 Mg Tablet, 500 MG PO BID for 3 Days

, TAB


   Prov:ZIA DOMINGUEZ PA-C         5/8/17


Cetirizine Hcl* (Zyrtec*) 10 Mg Capsule, 10 MG PO DAILY, #10 TAB.CHEW


   Prov:ZIA DOMINGUEZ PA-C         5/8/17


Acetaminophen* (Tylophen*) 500 Mg Capsule, 1 CAP PO Q6H Y for PAIN AND OR 

ELEVATED TEMP, #30 CAP


   Prov:ZIA DOMINGUEZ PA-C         5/8/17


Ibuprofen* (Motrin*) 600 Mg Tab, 600 MG PO Q6, #30 TAB


   Prov:ZIA DOMINGUEZ PA-C         5/8/17


Electrolyte,Oral (Pedialyte) 1,000 Ml Solution, 100 ML PO Q6 Y for vomiting, #

1000 ML


   Prov:ZIA DOMINGUEZ PA-C         5/8/17


Hydrocortisone* Topical (Hydrocortisone* Topical) 1%-28.35 Gm Cream..g., 1 

APPLIC TOP Q6 Y for ITCHING, #1 TUB


   Prov:ZIA DOMINGUEZ PA-C         5/8/17


Ondansetron Hcl* (Zofran*) 4 Mg Tablet, 4 MG PO Q6H for NAUSEA AND/OR VOMITING, 

#30 TAB


   Prov:ZIA DOMINGUEZ PA-C         5/8/17


Diphenhydramine Hcl* (Benadryl*) 25 Mg Cap, 25 MG PO Q6, #30 CAP


   Prov:ZIA DOMINGUEZ PA-C         5/8/17


Naproxen* (Naprosyn*) 500 Mg Tablet, 500 MG PO BID Y for PAIN AND/OR 

INFLAMMATION, #30 TAB


   Prov:ZIA DOMINGUEZ PA-C         3/9/17


Acetamin/Butalbital/Caffeine* (Fioricet*) [id number] Tab, 1 TAB PO Q6H Y 

for PAIN, #30 TAB


   Prov:ZIA DOMINGUEZ PA-C         3/9/17


Reported Medications


Omeprazole* (Omeprazole*) 40 Mg Capsule.dr, 40 MG PO DAILY, #30 CAP


   1/3/17





Allergies


Allergies:  


Coded Allergies:  


     Penicillins (Verified  Allergy, Mild, 12/16/17)


     amoxicillin (Verified  Allergy, Mild, 12/16/17)





PMhx/Soc


History of Surgery:  Yes (breast implants and btl)


Anesthesia Reaction:  Yes (nausea and  vomiting)


Hx Neurological Disorder:  Yes (migrane )


Hx Respiratory Disorders:  No


Hx Cardiac Disorders:  No


Hx Psychiatric Problems:  No


Hx Miscellaneous Medical Probl:  No


Hx Alcohol Use:  Yes (socially)


Hx Substance Use:  No


Hx Tobacco Use:  No


Smoking Status:  Never smoker





Physical Exam


Vitals





Vital Signs








  Date Time  Temp Pulse Resp B/P Pulse Ox O2 Delivery O2 Flow Rate FiO2


 


12/16/17 08:25 98.2 65 18 115/65 100   








Physical Exam


Const: Well-appearing.  In mild distress due to her pain.


Head:   Atraumatic 


Eyes:    Normal Conjunctiva.  No pain in eye movement.  Extraocular movement of 

her eyes within normal limits.  No visible field loss.


ENT:    Normal External Ears, Nose and Mouth.  Throat: Uvula is in midline not 

displaced.  Tonsils are +1 bilaterally with redness but no exudates.  

Tolerating secretions.  Patent airway.  Speaks full and clear sentences.


Neck:               Full range of motion..~ No meningismus.  No neck stiffness.

  No signs of meningeal irritation.


Resp:    Clear to auscultation bilaterally


Cardio:    Regular rate and rhythm, no murmurs


Abd:    Soft, non tender, non distended. Normal bowel sounds.  There is no 

right upper/right lower/epigastric/left upper/left lower abdominal tenderness 

and likely palpation.  Negative and psoas sign.  Negative on Markle sign (heel 

jar test).  Negative on Rovsing sign.  Able to jump twice without developing 

abdominal pain.


Skin:    No petechiae or rashes


Back:    No midline or flank tenderness.  No CVA tenderness.


Ext:    No cyanosis, or edema


Neur:    Awake and alert. No neurological deficits.  Romberg test negative.


Psych:    Normal Mood and Affect


Results 24 hrs





 Laboratory Tests








Test


  12/16/17


10:54


 


Bedside Urine pH (LAB) 6.5 


 


Bedside Urine Protein (LAB) Negative 


 


Bedside Urine Glucose (UA) Negative 


 


Bedside Urine Ketones (LAB) Negative 


 


Bedside Urine Blood Trace-intact 


 


Bedside Urine Nitrite (LAB) Negative 


 


Bedside Urine Leukocyte


Esterase (L Negative 


 








 Current Medications








 Medications


  (Trade)  Dose


 Ordered  Sig/Dameon


 Route


 PRN Reason  Start Time


 Stop Time Status Last Admin


Dose Admin


 


 Metoclopramide HCl


  (Reglan)  10 mg  ONCE  ONCE


 IV


   12/16/17 10:30


 12/16/17 10:31 DC 12/16/17 10:55


 


 


 Diphenhydramine


 HCl 25 mg  25 mg  ONCE  ONCE


 IV


   12/16/17 10:30


 12/16/17 10:31 DC 12/16/17 10:55


 


 


 Sodium Chloride


  (NS)  1,000 ml @ 


 1,000 mls/hr  Q1H ONCE


 IV


   12/16/17 11:00


 12/16/17 11:59 DC 12/16/17 10:55


 











Procedures/MDM


Treatment: IV insertion.  Normal saline IV 1 L bolus.  Reglan IV.  Benadryl IV.





Reevaluation: Denies headache, neck pain, neck stiffness, difficulty swallowing

, chest pain, back pain, abdominal pain.  No episode of emesis here in the 

emergency department.  No abdominal tenderness.  Stated that she feels much 

better this time and is ready to go home.





POC urine pregnancy: Negative.





POC urine dip: Reviewed.





Differential diagnosis: I have low suspicion for stroke, subarachnoid hemorrhage

, meningitis, severe or serious bacterial infection, peritonsillar abscess, 

pneumonia, appendicitis, pancreatitis, diverticulitis, diverticulosis, 

nephrolithiasis, pyelonephritis given that the patient stated that this is not 

the worst headache of her life, and the patient has no neurological deficits, 

shoulder stated that this is the same symptoms as she had her previous migraine 

attacks, oral airway is no obstruction and she is able to swallow, there is no 

abdominal tenderness, she was relieved of the treatment.





Final diagnosis: Bronchitis, migraine attack, sinusitis.





Prescription: Azithromycin.  Tylenol.  Motrin.  Reglan.  Benadryl.  Antivert.





Follow-up with PCP in the next 3-4 days.  Come back here in the emergency 

department for any new symptoms or any worsening of symptoms.  All questions 

and concerns are answered.  Patient verbalized understanding and agreed with 

the plan of care.





Hemodynamically stable on discharge.





Departure


Diagnosis:  


 Primary Impression:  


 Bronchitis


 Additional Impressions:  


 Migraine


 Sinusitis


Condition:  Stable





Additional Instructions:  


Follow-up with PCP in the next 3-4 days.  Come back here in the emergency 

department for any new symptoms or any worsening of symptoms.  All questions 

and concerns are answered.  Patient verbalized understanding and agreed with 

the plan of care.











JARROD MARQUEZ Dec 16, 2017 10:54 Yes

## 2018-02-22 NOTE — ED PROVIDER NOTE - NS ED ROS FT
No fever, no chills, no change in vision, no throat pain, no chest pain, no abdominal pain, no joint pain, no rashes, no focal neurologic complaints,  all ROS otherwise as per HPI or negative. ROS as per HPI, attending statment, or otherwise negative. No fever, no chills, no change in vision, no throat pain, no chest pain, no other abdominal pain, no joint pain, no rashes, no focal neurologic complaints,  all ROS otherwise as per HPI or negative. ROS as per HPI, attending statement, or otherwise negative.

## 2018-02-23 VITALS
OXYGEN SATURATION: 98 % | SYSTOLIC BLOOD PRESSURE: 116 MMHG | RESPIRATION RATE: 16 BRPM | DIASTOLIC BLOOD PRESSURE: 78 MMHG | HEART RATE: 64 BPM

## 2018-02-23 DIAGNOSIS — R69 ILLNESS, UNSPECIFIED: ICD-10-CM

## 2018-02-23 LAB
APPEARANCE UR: CLEAR — SIGNIFICANT CHANGE UP
BACTERIA # UR AUTO: ABNORMAL /HPF
BILIRUB UR-MCNC: NEGATIVE — SIGNIFICANT CHANGE UP
C TRACH RRNA SPEC QL NAA+PROBE: SIGNIFICANT CHANGE UP
COLOR SPEC: YELLOW — SIGNIFICANT CHANGE UP
CULTURE RESULTS: SIGNIFICANT CHANGE UP
DIFF PNL FLD: NEGATIVE — SIGNIFICANT CHANGE UP
EPI CELLS # UR: SIGNIFICANT CHANGE UP /HPF
GLUCOSE UR QL: NEGATIVE — SIGNIFICANT CHANGE UP
KETONES UR-MCNC: NEGATIVE — SIGNIFICANT CHANGE UP
LEUKOCYTE ESTERASE UR-ACNC: NEGATIVE — SIGNIFICANT CHANGE UP
N GONORRHOEA RRNA SPEC QL NAA+PROBE: SIGNIFICANT CHANGE UP
NITRITE UR-MCNC: NEGATIVE — SIGNIFICANT CHANGE UP
PH UR: 6.5 — SIGNIFICANT CHANGE UP (ref 5–8)
PROT UR-MCNC: NEGATIVE — SIGNIFICANT CHANGE UP
RBC CASTS # UR COMP ASSIST: SIGNIFICANT CHANGE UP /HPF (ref 0–2)
SP GR SPEC: 1.02 — SIGNIFICANT CHANGE UP (ref 1.01–1.02)
SPECIMEN SOURCE: SIGNIFICANT CHANGE UP
SPECIMEN SOURCE: SIGNIFICANT CHANGE UP
UROBILINOGEN FLD QL: NEGATIVE — SIGNIFICANT CHANGE UP
WBC UR QL: SIGNIFICANT CHANGE UP /HPF (ref 0–5)

## 2018-02-23 PROCEDURE — 76817 TRANSVAGINAL US OBSTETRIC: CPT | Mod: 26

## 2018-02-28 ENCOUNTER — APPOINTMENT (OUTPATIENT)
Dept: OBGYN | Facility: HOSPITAL | Age: 33
End: 2018-02-28

## 2018-03-03 ENCOUNTER — EMERGENCY (EMERGENCY)
Facility: HOSPITAL | Age: 33
LOS: 1 days | Discharge: HOME | End: 2018-03-03
Attending: PERSONAL EMERGENCY RESPONSE ATTENDANT | Admitting: PERSONAL EMERGENCY RESPONSE ATTENDANT
Payer: MEDICAID

## 2018-03-03 VITALS
RESPIRATION RATE: 18 BRPM | OXYGEN SATURATION: 99 % | DIASTOLIC BLOOD PRESSURE: 83 MMHG | HEART RATE: 88 BPM | SYSTOLIC BLOOD PRESSURE: 121 MMHG | TEMPERATURE: 98 F

## 2018-03-03 VITALS
DIASTOLIC BLOOD PRESSURE: 54 MMHG | HEART RATE: 72 BPM | SYSTOLIC BLOOD PRESSURE: 100 MMHG | RESPIRATION RATE: 18 BRPM | OXYGEN SATURATION: 98 %

## 2018-03-03 DIAGNOSIS — Z98.890 OTHER SPECIFIED POSTPROCEDURAL STATES: Chronic | ICD-10-CM

## 2018-03-03 LAB
ALBUMIN SERPL ELPH-MCNC: 3.9 G/DL — SIGNIFICANT CHANGE UP (ref 3.3–5)
ALP SERPL-CCNC: 53 U/L — SIGNIFICANT CHANGE UP (ref 40–120)
ALT FLD-CCNC: 9 U/L RC — LOW (ref 10–45)
ANION GAP SERPL CALC-SCNC: 11 MMOL/L — SIGNIFICANT CHANGE UP (ref 5–17)
APPEARANCE UR: CLEAR — SIGNIFICANT CHANGE UP
AST SERPL-CCNC: 11 U/L — SIGNIFICANT CHANGE UP (ref 10–40)
BASOPHILS # BLD AUTO: 0 K/UL — SIGNIFICANT CHANGE UP (ref 0–0.2)
BASOPHILS NFR BLD AUTO: 0.2 % — SIGNIFICANT CHANGE UP (ref 0–2)
BILIRUB SERPL-MCNC: 0.2 MG/DL — SIGNIFICANT CHANGE UP (ref 0.2–1.2)
BILIRUB UR-MCNC: NEGATIVE — SIGNIFICANT CHANGE UP
BLD GP AB SCN SERPL QL: NEGATIVE — SIGNIFICANT CHANGE UP
BUN SERPL-MCNC: 14 MG/DL — SIGNIFICANT CHANGE UP (ref 7–23)
CALCIUM SERPL-MCNC: 9.3 MG/DL — SIGNIFICANT CHANGE UP (ref 8.4–10.5)
CHLORIDE SERPL-SCNC: 105 MMOL/L — SIGNIFICANT CHANGE UP (ref 96–108)
CO2 SERPL-SCNC: 23 MMOL/L — SIGNIFICANT CHANGE UP (ref 22–31)
COLOR SPEC: YELLOW — SIGNIFICANT CHANGE UP
COMMENT - URINE: SIGNIFICANT CHANGE UP
CREAT SERPL-MCNC: 0.64 MG/DL — SIGNIFICANT CHANGE UP (ref 0.5–1.3)
DIFF PNL FLD: ABNORMAL
EOSINOPHIL # BLD AUTO: 0.1 K/UL — SIGNIFICANT CHANGE UP (ref 0–0.5)
EOSINOPHIL NFR BLD AUTO: 0.9 % — SIGNIFICANT CHANGE UP (ref 0–6)
EPI CELLS # UR: SIGNIFICANT CHANGE UP /HPF
GLUCOSE SERPL-MCNC: 108 MG/DL — HIGH (ref 70–99)
GLUCOSE UR QL: NEGATIVE — SIGNIFICANT CHANGE UP
HCG SERPL-ACNC: HIGH MIU/ML (ref 5–24)
HCT VFR BLD CALC: 41 % — SIGNIFICANT CHANGE UP (ref 34.5–45)
HGB BLD-MCNC: 13.8 G/DL — SIGNIFICANT CHANGE UP (ref 11.5–15.5)
KETONES UR-MCNC: ABNORMAL
LEUKOCYTE ESTERASE UR-ACNC: ABNORMAL
LYMPHOCYTES # BLD AUTO: 25.6 % — SIGNIFICANT CHANGE UP (ref 13–44)
LYMPHOCYTES # BLD AUTO: 3.8 K/UL — HIGH (ref 1–3.3)
MCHC RBC-ENTMCNC: 30.2 PG — SIGNIFICANT CHANGE UP (ref 27–34)
MCHC RBC-ENTMCNC: 33.7 GM/DL — SIGNIFICANT CHANGE UP (ref 32–36)
MCV RBC AUTO: 89.8 FL — SIGNIFICANT CHANGE UP (ref 80–100)
MONOCYTES # BLD AUTO: 1 K/UL — HIGH (ref 0–0.9)
MONOCYTES NFR BLD AUTO: 6.6 % — SIGNIFICANT CHANGE UP (ref 2–14)
NEUTROPHILS # BLD AUTO: 10 K/UL — HIGH (ref 1.8–7.4)
NEUTROPHILS NFR BLD AUTO: 66.7 % — SIGNIFICANT CHANGE UP (ref 43–77)
NITRITE UR-MCNC: NEGATIVE — SIGNIFICANT CHANGE UP
PH UR: 5.5 — SIGNIFICANT CHANGE UP (ref 5–8)
PLATELET # BLD AUTO: 302 K/UL — SIGNIFICANT CHANGE UP (ref 150–400)
POTASSIUM SERPL-MCNC: 4 MMOL/L — SIGNIFICANT CHANGE UP (ref 3.5–5.3)
POTASSIUM SERPL-SCNC: 4 MMOL/L — SIGNIFICANT CHANGE UP (ref 3.5–5.3)
PROT SERPL-MCNC: 7.2 G/DL — SIGNIFICANT CHANGE UP (ref 6–8.3)
PROT UR-MCNC: 30 MG/DL
RBC # BLD: 4.57 M/UL — SIGNIFICANT CHANGE UP (ref 3.8–5.2)
RBC # FLD: 11.5 % — SIGNIFICANT CHANGE UP (ref 10.3–14.5)
RBC CASTS # UR COMP ASSIST: >50 /HPF (ref 0–2)
RH IG SCN BLD-IMP: POSITIVE — SIGNIFICANT CHANGE UP
SODIUM SERPL-SCNC: 139 MMOL/L — SIGNIFICANT CHANGE UP (ref 135–145)
SP GR SPEC: >1.03 — HIGH (ref 1.01–1.02)
UROBILINOGEN FLD QL: NEGATIVE — SIGNIFICANT CHANGE UP
WBC # BLD: 14.9 K/UL — HIGH (ref 3.8–10.5)
WBC # FLD AUTO: 14.9 K/UL — HIGH (ref 3.8–10.5)
WBC UR QL: SIGNIFICANT CHANGE UP /HPF (ref 0–5)

## 2018-03-03 PROCEDURE — 86900 BLOOD TYPING SEROLOGIC ABO: CPT

## 2018-03-03 PROCEDURE — 76815 OB US LIMITED FETUS(S): CPT

## 2018-03-03 PROCEDURE — 81001 URINALYSIS AUTO W/SCOPE: CPT

## 2018-03-03 PROCEDURE — 80053 COMPREHEN METABOLIC PANEL: CPT

## 2018-03-03 PROCEDURE — 86901 BLOOD TYPING SEROLOGIC RH(D): CPT

## 2018-03-03 PROCEDURE — 76817 TRANSVAGINAL US OBSTETRIC: CPT | Mod: 26

## 2018-03-03 PROCEDURE — 86850 RBC ANTIBODY SCREEN: CPT

## 2018-03-03 PROCEDURE — 87186 SC STD MICRODIL/AGAR DIL: CPT

## 2018-03-03 PROCEDURE — 99285 EMERGENCY DEPT VISIT HI MDM: CPT | Mod: 25

## 2018-03-03 PROCEDURE — 99284 EMERGENCY DEPT VISIT MOD MDM: CPT | Mod: 25

## 2018-03-03 PROCEDURE — 85027 COMPLETE CBC AUTOMATED: CPT

## 2018-03-03 PROCEDURE — 76830 TRANSVAGINAL US NON-OB: CPT

## 2018-03-03 PROCEDURE — 84702 CHORIONIC GONADOTROPIN TEST: CPT

## 2018-03-03 PROCEDURE — 76815 OB US LIMITED FETUS(S): CPT | Mod: 26

## 2018-03-03 PROCEDURE — 96374 THER/PROPH/DIAG INJ IV PUSH: CPT

## 2018-03-03 PROCEDURE — 87086 URINE CULTURE/COLONY COUNT: CPT

## 2018-03-03 RX ORDER — ACETAMINOPHEN 500 MG
1000 TABLET ORAL ONCE
Qty: 0 | Refills: 0 | Status: COMPLETED | OUTPATIENT
Start: 2018-03-03 | End: 2018-03-03

## 2018-03-03 RX ORDER — SODIUM CHLORIDE 9 MG/ML
1000 INJECTION INTRAMUSCULAR; INTRAVENOUS; SUBCUTANEOUS ONCE
Qty: 0 | Refills: 0 | Status: DISCONTINUED | OUTPATIENT
Start: 2018-03-03 | End: 2018-03-07

## 2018-03-03 RX ADMIN — Medication 400 MILLIGRAM(S): at 02:03

## 2018-03-03 RX ADMIN — Medication 1000 MILLIGRAM(S): at 02:03

## 2018-03-03 NOTE — ED ADULT TRIAGE NOTE - CHIEF COMPLAINT QUOTE
presented to the ED complaining of vaginal bleed (1 pad used since episode started) reported being six week pregnant.  complaining of pressure in the pelvic area.

## 2018-03-03 NOTE — ED PROVIDER NOTE - MEDICAL DECISION MAKING DETAILS
32F 6 wks pregnant presenting with pelvic pain and vaginal bleeding after sexual intercourse. Concern for threatened miscarriage. Confirmed IUP on prior visit 1 week ago. Will order cbc, cmp, type and screen, TVUS and reassess 32F 6 wks pregnant presenting with pelvic pain and vaginal bleeding after sexual intercourse. Concern for threatened miscarriage. Gestational sac seen on TVUS on prior visit 1 week ago. Will order cbc, cmp, type and screen, TVUS and reassess

## 2018-03-03 NOTE — ED PROVIDER NOTE - CARE PLAN
Principal Discharge DX:	Vaginal bleeding in pregnancy, first trimester Principal Discharge DX:	Vaginal bleeding in pregnancy, first trimester  Assessment and plan of treatment:	Follow up with OB in 2-3 days

## 2018-03-03 NOTE — ED PROVIDER NOTE - ATTENDING CONTRIBUTION TO CARE
Attending MD Rodriguez.  Agree with above.  PT is a 32 yr  with pmhx depression presenting to ED with vaginal bleeding x 1 day wiht assoc pelvic pain.  States that she was having intercourse when she bgan having constant pain that was sharp.  She's used 2 pads since onset of sxs.  Recent hx of miscarriage last may and remote hx of  at 19 yrs old.  LMP early January.  Seen in ED 1.5 wks ago for abdominal cramping with evidence of an intrauterine gestaitonal sac with no fetal pole.  PT advised to follow-up following that visit for repeat sono and labs to confirm IUP.  PT was 'unable to make it to OB appt' since then.  Denies assoc fever/chills/CP/SOB/dizziness/cough. Attending MD Rodriguez.  Agree with above.  PT is a 32 yr  with pmhx depression presenting to ED with vaginal bleeding x 1 day wiht assoc pelvic pain.  States that she was having intercourse when she bgan having constant pain that was sharp.  She's used 2 pads since onset of sxs.  Recent hx of miscarriage last may and remote hx of  at 19 yrs old.  LMP early January.  Seen in ED 1.5 wks ago for abdominal cramping with evidence of an intrauterine gestaitonal sac with no fetal pole.  PT advised to follow-up following that visit for repeat sono and labs to confirm IUP.  PT was 'unable to make it to OB appt' since then.  Denies assoc fever/chills/CP/SOB/dizziness/cough.  Pt with diffuse suprapubic TTP on exam without severe pain/guarding.  Small amount of brb in vault.  Limited pelvic exam 2/2 lack of discharge, active preg.  Sono c/w moderate subchor hematoma.  Pt advised of live intrauterine gestation c/w HCG as well as concern for threatened miscarriage.  A+ blood type.  Pt advised of need to have pelvic rest, follow-up with OB/GYN for reassessment early next week.  Return to ED for new/worsening pain.  Return to ED for significant bleeding/vaginal hemorrhage.  Stable for discharge.  Pt  verbalizes understanding of plan of care.

## 2018-03-03 NOTE — ED PROVIDER NOTE - OBJECTIVE STATEMENT
32F  with PMH of depression presenting with vaginal bleeding x1 day with associated pelvic pain. States that she was having intercourse when symptoms began, described as a sharp constant pain. Has had to change 1 pad since onset of symptoms. Recent hx of miscarriage in may and hx of  when she was 19. LMP early January. 32F  with PMH of depression presenting with vaginal bleeding x1 day with associated pelvic pain. States that she was having intercourse when symptoms began, described as a sharp constant pain. Has had to change 1 pad since onset of symptoms. Recent hx of miscarriage in may and hx of  when she was 19. LMP early January. Patient was recently seen in ED about 1.5 weeks ago for abdominal cramping with intrauterine gestational sac with no fetal pole with recommendation for repeat US to confirm IUP. Patient was unable to make it to OB appointment. Denies fever, chills, cp, sob, dizziness, cough, rhinorrhea.

## 2018-03-03 NOTE — ED ADULT NURSE NOTE - OBJECTIVE STATEMENT
33 yo female, 3rd pregnancy, no living child, 6 weeks pregnant complaining of vaginal bleeding since intercourse approximately 2 hours ago.  Pt states she noticed blood but was not spotting before and not active bleeding at this moment. Pt states pain "I have been cramping this whole pregnancy, but now I am pretty uncomfortable". Pt denies any other symptoms. Pt alerted and oriented x3, no signs of acute distress noted at this moment, Heart sounds normal, lungs clear. 18G placed on right Wrist, blood collected, sent to lab. One litter of NS given and IV Ofcarmen. MD at the bedside, will continue to monitor . Family at the bedside ().

## 2018-03-03 NOTE — ED PROVIDER NOTE - PROGRESS NOTE DETAILS
Nuvia: Patient states pain improved. Nuvia: US noted for live pregnancy with moderate subchorionic hematoma. Patient stable for discharge with OB follow up.

## 2018-03-05 NOTE — ED POST DISCHARGE NOTE - OTHER COMMUNICATION
Contacted patient on 3/5/18 approx 2:30 to discuss urine cx results and assess how patient is feeling. Patient was at work and stated it was not a good time to discuss her medical issues. Patient stated to call back after 5 and gave call back number if patient would like to discuss earlier than 5. Would prescribe cipro 250mg BIDx 5 days based on urine culture sensitivities. Will call back patient approx 4:45pm-Natalie Hernandez PA-C Contacted patient on 3/5/18 approx 2:30pm to discuss urine cx results and assess how patient is feeling. Patient was at work and stated it was not a good time to discuss her medical issues. Patient stated to call back after 5 and gave call back number if patient would like to discuss earlier than 5. Would prescribe cipro 250mg BIDx 5 days based on urine culture sensitivities. Will call back patient approx 4:45pm-Natalie Hernandez PA-C

## 2018-03-05 NOTE — ED POST DISCHARGE NOTE - ADDITIONAL DOCUMENTATION
Spoke to patient on 3/5/18 approx 4:50pm and informed pt regarding +urine culture. Patient stated she has urinary frequency but denied dysuria, back pain or fevers. Informed pt will be sending bactrim ds PO BIDx3 days according to uptodate (urine culture sensitive to bactrim). Educated patient to return to ED for worsening symptoms. Patient understood and will complete course of antibiotics-Natalie Hernandez PA-C

## 2018-03-06 ENCOUNTER — OUTPATIENT (OUTPATIENT)
Dept: OUTPATIENT SERVICES | Facility: HOSPITAL | Age: 33
LOS: 1 days | End: 2018-03-06
Payer: MEDICAID

## 2018-03-06 ENCOUNTER — APPOINTMENT (OUTPATIENT)
Dept: OBGYN | Facility: HOSPITAL | Age: 33
End: 2018-03-06
Payer: MEDICAID

## 2018-03-06 ENCOUNTER — LABORATORY RESULT (OUTPATIENT)
Age: 33
End: 2018-03-06

## 2018-03-06 ENCOUNTER — RESULT REVIEW (OUTPATIENT)
Age: 33
End: 2018-03-06

## 2018-03-06 VITALS
HEIGHT: 66 IN | SYSTOLIC BLOOD PRESSURE: 111 MMHG | HEART RATE: 84 BPM | DIASTOLIC BLOOD PRESSURE: 84 MMHG | WEIGHT: 170 LBS | BODY MASS INDEX: 27.32 KG/M2

## 2018-03-06 DIAGNOSIS — Z98.890 OTHER SPECIFIED POSTPROCEDURAL STATES: Chronic | ICD-10-CM

## 2018-03-06 DIAGNOSIS — Z34.90 ENCOUNTER FOR SUPERVISION OF NORMAL PREGNANCY, UNSPECIFIED, UNSPECIFIED TRIMESTER: ICD-10-CM

## 2018-03-06 PROCEDURE — 99213 OFFICE O/P EST LOW 20 MIN: CPT | Mod: GC

## 2018-03-06 RX ORDER — NITROFURANTOIN (MONOHYDRATE/MACROCRYSTALS) 25; 75 MG/1; MG/1
100 CAPSULE ORAL
Qty: 14 | Refills: 0 | Status: ACTIVE | COMMUNITY
Start: 2018-03-06 | End: 1900-01-01

## 2018-03-07 PROBLEM — Z34.90 PREGNANCY: Status: ACTIVE | Noted: 2018-03-06

## 2018-03-07 LAB
C TRACH RRNA SPEC QL NAA+PROBE: SIGNIFICANT CHANGE UP
HPV HIGH+LOW RISK DNA PNL CVX: SIGNIFICANT CHANGE UP
N GONORRHOEA RRNA SPEC QL NAA+PROBE: SIGNIFICANT CHANGE UP
SPECIMEN SOURCE: SIGNIFICANT CHANGE UP

## 2018-03-08 DIAGNOSIS — Z34.90 ENCOUNTER FOR SUPERVISION OF NORMAL PREGNANCY, UNSPECIFIED, UNSPECIFIED TRIMESTER: ICD-10-CM

## 2018-03-10 LAB — CYTOLOGY SPEC DOC CYTO: SIGNIFICANT CHANGE UP

## 2018-03-11 ENCOUNTER — TRANSCRIPTION ENCOUNTER (OUTPATIENT)
Age: 33
End: 2018-03-11

## 2018-04-13 ENCOUNTER — APPOINTMENT (OUTPATIENT)
Dept: OBGYN | Facility: CLINIC | Age: 33
End: 2018-04-13

## 2018-07-17 ENCOUNTER — OTHER (OUTPATIENT)
Age: 33
End: 2018-07-17

## 2018-08-04 ENCOUNTER — EMERGENCY (EMERGENCY)
Facility: HOSPITAL | Age: 33
LOS: 1 days | Discharge: HOME | End: 2018-08-04
Attending: EMERGENCY MEDICINE
Payer: MEDICAID

## 2018-08-04 VITALS
TEMPERATURE: 98 F | SYSTOLIC BLOOD PRESSURE: 114 MMHG | RESPIRATION RATE: 18 BRPM | HEART RATE: 65 BPM | WEIGHT: 134.92 LBS | DIASTOLIC BLOOD PRESSURE: 75 MMHG | OXYGEN SATURATION: 98 %

## 2018-08-04 VITALS
TEMPERATURE: 98 F | SYSTOLIC BLOOD PRESSURE: 116 MMHG | OXYGEN SATURATION: 98 % | RESPIRATION RATE: 18 BRPM | DIASTOLIC BLOOD PRESSURE: 65 MMHG | HEART RATE: 70 BPM

## 2018-08-04 DIAGNOSIS — Z98.890 OTHER SPECIFIED POSTPROCEDURAL STATES: Chronic | ICD-10-CM

## 2018-08-04 PROBLEM — F32.9 MAJOR DEPRESSIVE DISORDER, SINGLE EPISODE, UNSPECIFIED: Chronic | Status: ACTIVE | Noted: 2017-07-31

## 2018-08-04 LAB
ALBUMIN SERPL ELPH-MCNC: 4.3 G/DL — SIGNIFICANT CHANGE UP (ref 3.3–5)
ALP SERPL-CCNC: 49 U/L — SIGNIFICANT CHANGE UP (ref 40–120)
ALT FLD-CCNC: 9 U/L — LOW (ref 10–45)
ANION GAP SERPL CALC-SCNC: 11 MMOL/L — SIGNIFICANT CHANGE UP (ref 5–17)
APPEARANCE UR: ABNORMAL
APTT BLD: 26.3 SEC — LOW (ref 27.5–37.4)
AST SERPL-CCNC: 13 U/L — SIGNIFICANT CHANGE UP (ref 10–40)
BASOPHILS # BLD AUTO: 0.1 K/UL — SIGNIFICANT CHANGE UP (ref 0–0.2)
BASOPHILS NFR BLD AUTO: 1.1 % — SIGNIFICANT CHANGE UP (ref 0–2)
BILIRUB SERPL-MCNC: 0.5 MG/DL — SIGNIFICANT CHANGE UP (ref 0.2–1.2)
BILIRUB UR-MCNC: NEGATIVE — SIGNIFICANT CHANGE UP
BLD GP AB SCN SERPL QL: NEGATIVE — SIGNIFICANT CHANGE UP
BUN SERPL-MCNC: 7 MG/DL — SIGNIFICANT CHANGE UP (ref 7–23)
CALCIUM SERPL-MCNC: 9 MG/DL — SIGNIFICANT CHANGE UP (ref 8.4–10.5)
CHLORIDE SERPL-SCNC: 105 MMOL/L — SIGNIFICANT CHANGE UP (ref 96–108)
CO2 SERPL-SCNC: 19 MMOL/L — LOW (ref 22–31)
COLOR SPEC: YELLOW — SIGNIFICANT CHANGE UP
CREAT SERPL-MCNC: 0.69 MG/DL — SIGNIFICANT CHANGE UP (ref 0.5–1.3)
DIFF PNL FLD: ABNORMAL
EOSINOPHIL # BLD AUTO: 0.1 K/UL — SIGNIFICANT CHANGE UP (ref 0–0.5)
EOSINOPHIL NFR BLD AUTO: 0.8 % — SIGNIFICANT CHANGE UP (ref 0–6)
EPI CELLS # UR: SIGNIFICANT CHANGE UP /HPF
GLUCOSE SERPL-MCNC: 95 MG/DL — SIGNIFICANT CHANGE UP (ref 70–99)
GLUCOSE UR QL: NEGATIVE — SIGNIFICANT CHANGE UP
HCG SERPL-ACNC: HIGH MIU/ML
HCT VFR BLD CALC: 42.2 % — SIGNIFICANT CHANGE UP (ref 34.5–45)
HGB BLD-MCNC: 14.5 G/DL — SIGNIFICANT CHANGE UP (ref 11.5–15.5)
INR BLD: 1.14 RATIO — SIGNIFICANT CHANGE UP (ref 0.88–1.16)
KETONES UR-MCNC: ABNORMAL
LEUKOCYTE ESTERASE UR-ACNC: NEGATIVE — SIGNIFICANT CHANGE UP
LYMPHOCYTES # BLD AUTO: 19.2 % — SIGNIFICANT CHANGE UP (ref 13–44)
LYMPHOCYTES # BLD AUTO: 2.1 K/UL — SIGNIFICANT CHANGE UP (ref 1–3.3)
MCHC RBC-ENTMCNC: 29.2 PG — SIGNIFICANT CHANGE UP (ref 27–34)
MCHC RBC-ENTMCNC: 34.3 GM/DL — SIGNIFICANT CHANGE UP (ref 32–36)
MCV RBC AUTO: 85.2 FL — SIGNIFICANT CHANGE UP (ref 80–100)
MONOCYTES # BLD AUTO: 0.6 K/UL — SIGNIFICANT CHANGE UP (ref 0–0.9)
MONOCYTES NFR BLD AUTO: 5.1 % — SIGNIFICANT CHANGE UP (ref 2–14)
NEUTROPHILS # BLD AUTO: 8.1 K/UL — HIGH (ref 1.8–7.4)
NEUTROPHILS NFR BLD AUTO: 73.7 % — SIGNIFICANT CHANGE UP (ref 43–77)
NITRITE UR-MCNC: NEGATIVE — SIGNIFICANT CHANGE UP
PH UR: 5.5 — SIGNIFICANT CHANGE UP (ref 5–8)
PLATELET # BLD AUTO: 295 K/UL — SIGNIFICANT CHANGE UP (ref 150–400)
POTASSIUM SERPL-MCNC: 4 MMOL/L — SIGNIFICANT CHANGE UP (ref 3.5–5.3)
POTASSIUM SERPL-SCNC: 4 MMOL/L — SIGNIFICANT CHANGE UP (ref 3.5–5.3)
PROT SERPL-MCNC: 7.3 G/DL — SIGNIFICANT CHANGE UP (ref 6–8.3)
PROT UR-MCNC: 30 MG/DL
PROTHROM AB SERPL-ACNC: 12.5 SEC — SIGNIFICANT CHANGE UP (ref 9.8–12.7)
RBC # BLD: 4.95 M/UL — SIGNIFICANT CHANGE UP (ref 3.8–5.2)
RBC # FLD: 12.2 % — SIGNIFICANT CHANGE UP (ref 10.3–14.5)
RBC CASTS # UR COMP ASSIST: >50 /HPF (ref 0–2)
RH IG SCN BLD-IMP: POSITIVE — SIGNIFICANT CHANGE UP
SODIUM SERPL-SCNC: 135 MMOL/L — SIGNIFICANT CHANGE UP (ref 135–145)
SP GR SPEC: >1.03 — HIGH (ref 1.01–1.02)
UROBILINOGEN FLD QL: NEGATIVE — SIGNIFICANT CHANGE UP
WBC # BLD: 11 K/UL — HIGH (ref 3.8–10.5)
WBC # FLD AUTO: 11 K/UL — HIGH (ref 3.8–10.5)
WBC UR QL: SIGNIFICANT CHANGE UP /HPF (ref 0–5)

## 2018-08-04 PROCEDURE — 85730 THROMBOPLASTIN TIME PARTIAL: CPT

## 2018-08-04 PROCEDURE — 86900 BLOOD TYPING SEROLOGIC ABO: CPT

## 2018-08-04 PROCEDURE — 85610 PROTHROMBIN TIME: CPT

## 2018-08-04 PROCEDURE — 76817 TRANSVAGINAL US OBSTETRIC: CPT

## 2018-08-04 PROCEDURE — 86901 BLOOD TYPING SEROLOGIC RH(D): CPT

## 2018-08-04 PROCEDURE — 84702 CHORIONIC GONADOTROPIN TEST: CPT

## 2018-08-04 PROCEDURE — 93975 VASCULAR STUDY: CPT

## 2018-08-04 PROCEDURE — 96361 HYDRATE IV INFUSION ADD-ON: CPT

## 2018-08-04 PROCEDURE — 96365 THER/PROPH/DIAG IV INF INIT: CPT

## 2018-08-04 PROCEDURE — 81001 URINALYSIS AUTO W/SCOPE: CPT

## 2018-08-04 PROCEDURE — 80053 COMPREHEN METABOLIC PANEL: CPT

## 2018-08-04 PROCEDURE — 76817 TRANSVAGINAL US OBSTETRIC: CPT | Mod: 26

## 2018-08-04 PROCEDURE — 99284 EMERGENCY DEPT VISIT MOD MDM: CPT

## 2018-08-04 PROCEDURE — 85027 COMPLETE CBC AUTOMATED: CPT

## 2018-08-04 PROCEDURE — 99284 EMERGENCY DEPT VISIT MOD MDM: CPT | Mod: 25

## 2018-08-04 PROCEDURE — 96375 TX/PRO/DX INJ NEW DRUG ADDON: CPT

## 2018-08-04 PROCEDURE — 93975 VASCULAR STUDY: CPT | Mod: 26

## 2018-08-04 PROCEDURE — 86850 RBC ANTIBODY SCREEN: CPT

## 2018-08-04 RX ORDER — SODIUM CHLORIDE 9 MG/ML
1000 INJECTION INTRAMUSCULAR; INTRAVENOUS; SUBCUTANEOUS ONCE
Qty: 0 | Refills: 0 | Status: COMPLETED | OUTPATIENT
Start: 2018-08-04 | End: 2018-08-04

## 2018-08-04 RX ORDER — OXYCODONE HYDROCHLORIDE 5 MG/1
5 TABLET ORAL ONCE
Qty: 0 | Refills: 0 | Status: DISCONTINUED | OUTPATIENT
Start: 2018-08-04 | End: 2018-08-04

## 2018-08-04 RX ORDER — ACETAMINOPHEN 500 MG
1000 TABLET ORAL ONCE
Qty: 0 | Refills: 0 | Status: COMPLETED | OUTPATIENT
Start: 2018-08-04 | End: 2018-08-04

## 2018-08-04 RX ORDER — ONDANSETRON 8 MG/1
4 TABLET, FILM COATED ORAL ONCE
Qty: 0 | Refills: 0 | Status: COMPLETED | OUTPATIENT
Start: 2018-08-04 | End: 2018-08-04

## 2018-08-04 RX ADMIN — Medication 400 MILLIGRAM(S): at 12:27

## 2018-08-04 RX ADMIN — SODIUM CHLORIDE 1000 MILLILITER(S): 9 INJECTION INTRAMUSCULAR; INTRAVENOUS; SUBCUTANEOUS at 14:15

## 2018-08-04 RX ADMIN — Medication 1000 MILLIGRAM(S): at 14:15

## 2018-08-04 RX ADMIN — SODIUM CHLORIDE 1000 MILLILITER(S): 9 INJECTION INTRAMUSCULAR; INTRAVENOUS; SUBCUTANEOUS at 12:27

## 2018-08-04 RX ADMIN — ONDANSETRON 4 MILLIGRAM(S): 8 TABLET, FILM COATED ORAL at 12:27

## 2018-08-04 RX ADMIN — OXYCODONE HYDROCHLORIDE 5 MILLIGRAM(S): 5 TABLET ORAL at 13:16

## 2018-08-04 RX ADMIN — OXYCODONE HYDROCHLORIDE 5 MILLIGRAM(S): 5 TABLET ORAL at 14:15

## 2018-08-04 RX ADMIN — Medication 1000 MILLIGRAM(S): at 13:15

## 2018-08-04 NOTE — ED PROVIDER NOTE - MEDICAL DECISION MAKING DETAILS
31 yo F 7 wga pw vaginal bleeding, cbc, cmp, type and screen, tvus 33 yo F 7 wga pw vaginal bleeding, cbc, cmp, type and screen, tvus  Attending Maria Eugenia Crooks: 31 y/o female  at [facility] 7-8 weeks gestation presenting with vaginal bleeding. on exam abd soft, and hemodynamically stable. will obtain HCG, type and screen, TVUS and re-eval.

## 2018-08-04 NOTE — ED ADULT NURSE NOTE - OBJECTIVE STATEMENT
32 yr old female a/OX 3 c/o vaginal bleeding and cramping that started today.  Pt is approximately 8 weeks pregnant and is .  PERRL wnl, andrade withe qual strength.  LUNGS CTA no chest pain or sob.  Denies fevers or chills c/o N/v and abdominal pain and cramping in B/L LQ.  Bleeding is described as heavy, with no clots.  SKIN WDI.  PEripheral pulses +2bl no edema

## 2018-08-04 NOTE — ED PROVIDER NOTE - PHYSICAL EXAMINATION
VITALS: reviewed  GEN: NAD, A & O x 3  HEAD/EYES: NCAT, PERRL, EOMI  ENT: mucus membranes moist, oropharynx WNL  RESP: lungs CTA with equal breath sounds bilaterally  CV: heart with reg rhythm S1, S2; distal pulses intact and symmetric bilaterally  ABDOMEN: normoactive bowel sounds, soft, nondistended, no palpable masses, suprapubic tenderness  : no CVAT  MSK: extremities atraumatic and nontender, no edema, no asymmetry  SKIN: warm, dry, no rash, no bruising, no cyanosis. color appropriate for ethnicity  NEURO: alert, mentating appropriately, no facial asymmetry.  PSYCH: Affect appropriate VITALS: reviewed  GEN: NAD, A & O x 3  HEAD/EYES: NCAT, PERRL, EOMI  ENT: mucus membranes moist, oropharynx WNL  RESP: lungs CTA with equal breath sounds bilaterally  CV: heart with reg rhythm S1, S2; distal pulses intact and symmetric bilaterally  ABDOMEN: normoactive bowel sounds, soft, nondistended, no palpable masses, suprapubic tenderness  : no CVAT  MSK: extremities atraumatic and nontender, no edema, no asymmetry  SKIN: warm, dry, no rash, no bruising, no cyanosis. color appropriate for ethnicity  NEURO: alert, mentating appropriately, no facial asymmetry.  PSYCH: Affect appropriate  Attending Maria Eugenia Crooks: Gen: NAD, heent: atrauamtic, eomi, perrla, mmm, op pink, uvula midline, neck; nttp, no nuchal rigidity, chest: nttp, no crepitus, cv: rrr, no murmurs, lungs: ctab, abd: soft, mild lower abd ttp, no peritoneal signs, +BS, no guarding, ext: wwp, neg homans, skin: no rash, neuro: awake and alert, following commands, speech clear, sensation and strength intact, no focal deficits

## 2018-08-04 NOTE — ED PROVIDER NOTE - PROGRESS NOTE DETAILS
Attending Maria Eugenia Crooks: u/s shows likely nonviable pregnancy. pt with cramping suggestive of likely miscarriage. d/w pt. prefers to go home and follow up with her ob. d/w pt bleeding precautions, and need to have repeat u/s and OB follow up. pt understands and will return for any changes

## 2018-08-04 NOTE — ED PROVIDER NOTE - NS ED ROS FT

## 2018-08-04 NOTE — ED PROVIDER NOTE - OBJECTIVE STATEMENT
33 yo F  @ 7wga by LMP presenting with vaginal bleeding x 2.5 hours and abdominal cramping x one week. Denies fevers/chills. No cp/sob. No calf pain/swelling. Pt states "I feel like I just passed something large." Denies history of clotting disorder. No u/s to confirm IUP.     GYN: Dr. Cowan in [place] 31 yo F  @ 7wga by LMP presenting with vaginal bleeding x 2.5 hours and abdominal cramping x one week. Denies fevers/chills. No cp/sob. No calf pain/swelling. Pt states "I feel like I just passed something large." Denies history of clotting disorder. No u/s to confirm IUP.   Attending Maria Eugenia Crooks: agree with above. additionally pt has had prior miscarriages. was having sexual intercourse when bleeding started. denies any trauma. reports lower abdominal cramping    GYN: Dr. oCwan in [place]

## 2020-03-09 NOTE — ED PROVIDER NOTE - DISCHARGE DATE
03-Mar-2018 Bexarotene Pregnancy And Lactation Text: This medication is Pregnancy Category X and should not be given to women who are pregnant or may become pregnant. This medication should not be used if you are breast feeding.

## 2020-04-17 ENCOUNTER — APPOINTMENT (OUTPATIENT)
Dept: DISASTER EMERGENCY | Facility: CLINIC | Age: 35
End: 2020-04-17

## 2020-04-21 ENCOUNTER — APPOINTMENT (OUTPATIENT)
Dept: DISASTER EMERGENCY | Facility: CLINIC | Age: 35
End: 2020-04-21

## 2020-04-25 ENCOUNTER — MESSAGE (OUTPATIENT)
Age: 35
End: 2020-04-25

## 2020-05-09 LAB
SARS-COV-2 IGG SERPL IA-ACNC: 0.4 INDEX
SARS-COV-2 IGG SERPL QL IA: NEGATIVE

## 2021-01-14 ENCOUNTER — APPOINTMENT (OUTPATIENT)
Dept: OPHTHALMOLOGY | Facility: CLINIC | Age: 36
End: 2021-01-14

## 2021-04-19 NOTE — ED ADULT NURSE NOTE - NS ED NURSE DISCH DISPOSITION
pregabalin (LYRICA) 50 MG capsule, , Disp: , Rfl:     ibuprofen (ADVIL;MOTRIN) 200 MG CAPS, Take 2 tablets by mouth as needed, Disp: , Rfl:     diclofenac (VOLTAREN) 50 MG EC tablet, Take 1 tablet by mouth 3 times daily (with meals), Disp: 60 tablet, Rfl: 1    Ankle Foot Arthosis MISC, by Does not apply route Use lateral flare to correct varus L side Peroneal tendinitis of left lower extremity  (primary encounter diagnosis)  Left foot pain  Acquired heel varus of left foot, Disp: 1 each, Rfl: 0    glimepiride (AMARYL) 4 MG tablet, Take 4 mg by mouth, Disp: , Rfl:     metFORMIN (GLUCOPHAGE-XR) 750 MG extended release tablet, Take 750 mg by mouth, Disp: , Rfl:     LEVEMIR FLEXTOUCH 100 UNIT/ML injection pen, , Disp: , Rfl: 2    metoprolol succinate (TOPROL XL) 100 MG extended release tablet, Take 100 mg by mouth, Disp: , Rfl:     atorvastatin (LIPITOR) 20 MG tablet, , Disp: , Rfl: 3    famotidine (PEPCID) 40 MG tablet, , Disp: , Rfl: 2    sertraline (ZOLOFT) 50 MG tablet, , Disp: , Rfl: 3    ferrous sulfate 325 (65 Fe) MG tablet, , Disp: , Rfl: 3    levothyroxine (SYNTHROID) 50 MCG tablet, , Disp: , Rfl: 3    losartan (COZAAR) 25 MG tablet, , Disp: , Rfl: 3     Allergies:    Erythromycin base and Gabapentin    Family History:  family history includes Cancer in her father and mother; Diabetes in her father and mother; Heart Defect in her mother; Hypertension in her mother; Hyperthyroidism in her mother.     Social History:   Social History     Occupational History    Not on file   Tobacco Use    Smoking status: Current Every Day Smoker     Packs/day: 0.50     Types: Cigarettes    Smokeless tobacco: Never Used   Substance and Sexual Activity    Alcohol use: Never     Frequency: Never    Drug use: Never    Sexual activity: Not on file     Occupation: Works as a  full-time     OBJECTIVE:  Resp 12   Ht 5' 8\" (1.727 m)   Wt (!) 303 lb (137.4 kg)   BMI 46.07 kg/m²    Psych: alert and oriented to person, time, and place  Cardio:  well perfused extremities  Resp:  normal respiratory effort  Skin:  no cyanosis  Hem/lymph:  no lymphedema  Neuro:  sensation to light touch grossly intact throughout all nerve distributions in the foot   Musculoskeletal:    RLE:  Vascular: Toes warm and well perfused, compartments soft/compressible, no significant swelling of foot. Skin: Intact without rash/lesions/AV malformations. Strength: Able to fire/perform the following with appropriate strength:    [x]  Tib Ant:     [x]  Gastroc-Soleus:         [x]  Inversion:      []  Eversion:  weak, painful       [x]  FHL:     [x]  EHL:      Motion:  Normal for the following joints:    [x]  Ankle:      [x]  Subtalar:       [x]  1st MTP:        []  1st TMT:            Tenderness to Palpation:    Tenderness to palpation:  along course of peroneal tendons  -Equinus      LLE:  Vascular: Toes warm and well perfused, compartments soft/compressible, no significant swelling of foot. Skin: Intact without rash/lesions/AV malformations. Strength: Able to fire/perform the following with appropriate strength:    [x]  Tib Ant:     [x]  Gastroc-Soleus:         [x]  Inversion:      []  Eversion:  weak, painful       [x]  FHL:     [x]  EHL:      Motion:  Normal for the following joints:    [x]  Ankle:      [x]  Subtalar:       [x]  1st MTP:        []  1st TMT:            Tenderness to Palpation:    Tenderness to palpation:  along course of peroneal tendons  -Equinus    Instability:   -Talar tilt: Negative  -Anterior drawer: Negative  -No peroneal subluxation/dislocation with dorsiflexion + eversion or with circumduction      RADIOLOGY:   4/19/2021 FINDINGS:  Three weightbearing views (AP, Mortise, and Lateral) of the bilateral ankle and three weightbearing views (AP, Oblique, Lateral) of the bilateral foot were obtained in the office today and reviewed, revealing no acute fracture, dislocation, or radioopaque foreign body/tumor.  The ankle mortise is maintained with no widening of the clear spaces. Narrowed talocalcaneal angle worse on the left than on the right. IMPRESSION:  No acute fracture/dislocation. Electronically signed by Henrik Chakraborty MD      ASSESSMENT AND PLAN:  Body mass index is 46.07 kg/m². She has bilateral (left greater than right) peroneal tendinopathy, with underlying mild cavus radiographically and equinus. She also reports a history of left ankle instability (negative physical exam findings). Notably, she has a complex past medical history. She has a history of heart disease, insulin-dependent diabetes with neuropathy, history of CRPS type II, and tobacco use (reports she smokes 1/2 pack cigarettes per day). We had a discussion today about the likely diagnosis and its natural history, physical exam and imaging findings, as well as various treatment options in detail. Surgically, we discussed possible peroneal tendon repair versus debridement/tenolysis and/or tendon transfer, depending on symptom control with conservative management. We discussed the expected postoperative course, including the relevant weightbearing restrictions and immobilization. At this point, the patient does wish to attempt to treat her problem conservatively if possible. Given her history of CRPS type II, and her insulin-dependent diabetes with neuropathy as well as her tobacco use, she does have significant risk factors for postoperative complications. Orders/referrals were placed as below at today's visit. The patient was cautioned to avoid pain provoking activity. The patient will use a cam boot when ambulatory for pain control, which was provided today along with an Ace wrap. We discussed the risks of blood clots, and she will remove the cam boot often for range of motion, particularly when she is not on her feet.  I referred the patient to physical therapy for my peroneal tendinopathy protocol, as well as my ankle sprain protocol. I provided a prescription for Voltaren (4g TOPL q QID PRN pain). I recommend this over the use of oral NSAIDs because of her diabetes. The patient was also ordered compression socks. The patient was also provided information on how to obtain an over-the-counter cavus style orthotic. All questions were answered and the above plan was agreed upon. The patient will return to clinic in 3 months without x-rays. At her next visit, depending on how she is doing, we may consider an MRI of her left ankle/foot. At the patient's next visit, depending on how the patient is doing and/or new imaging/labs results, we may consider the following options:    []  Orthotic (OTC)     []  Orthotic (custom)          []  Rocker bottom shoes     []  Brace (OTC)        []  Brace (custom)             []  CAM boot        []  Night splint         []  Heel cups        []  Strap      []  Toe sleeves/splints    []  PT:                     []  Wean out of immobilization   []  Advance activity       []  Topical               []  NSAIDs          []  Evelio         []  Referral:         []  Stress xrays       []  CT         []  MRI        []  Injection:         []  Consider OR      []  Pick OR date    Return in about 3 months (around 7/19/2021). No orders of the defined types were placed in this encounter.     Orders Placed This Encounter   Procedures    Ambulatory referral to Physical Therapy     Referral Priority:   Routine     Referral Type:   Eval and Treat     Requested Specialty:   Physical Therapy     Number of Visits Requested:   1    Ambulatory referral to Physical Therapy     Referral Priority:   Routine     Referral Type:   Eval and Treat     Referral Reason:   Specialty Services Required     Requested Specialty:   Physical Therapy     Number of Visits Requested:   1    DME Order for (Specify) as OP     DME: compression stockings (20-30 mm Hg)  Routine, External, Referral By - William Soto-1, Supply Name: Compression Stockings Prognosis: good Estimated length of need: 80         Irma Kate MD  Orthopedic Surgery        Please excuse any typos/errors, as this note was created with the assistance of voice recognition software. While intending to generate a document that actually reflects the content of the visit, the document can still have some errors including those of syntax and sound-a-like substitutions which may escape proof reading. In such instances, actual meaning can be extrapolated by context. Discharged

## 2022-09-19 NOTE — ED ADULT TRIAGE NOTE - CCCP TRG CHIEF CMPLNT
Called patient and informed her of PCP's message below.     Patient denies any symptoms in regards to the kidney stone found on the left side.     Patient will call orthopedist for an appt to follow up on the fluid build up of the right hip.     Patient thanked for call.    vaginal bleeding

## 2023-06-17 ENCOUNTER — EMERGENCY (EMERGENCY)
Facility: HOSPITAL | Age: 38
LOS: 1 days | Discharge: PSYCHIATRIC OR SUBSTANCE USE FACILITY | End: 2023-06-17
Attending: STUDENT IN AN ORGANIZED HEALTH CARE EDUCATION/TRAINING PROGRAM
Payer: MEDICAID

## 2023-06-17 VITALS
TEMPERATURE: 99 F | RESPIRATION RATE: 18 BRPM | SYSTOLIC BLOOD PRESSURE: 123 MMHG | HEART RATE: 70 BPM | DIASTOLIC BLOOD PRESSURE: 85 MMHG | WEIGHT: 154.98 LBS | OXYGEN SATURATION: 97 %

## 2023-06-17 DIAGNOSIS — Z98.890 OTHER SPECIFIED POSTPROCEDURAL STATES: Chronic | ICD-10-CM

## 2023-06-17 DIAGNOSIS — F32.A DEPRESSION, UNSPECIFIED: ICD-10-CM

## 2023-06-17 LAB
ALBUMIN SERPL ELPH-MCNC: 4.5 G/DL — SIGNIFICANT CHANGE UP (ref 3.3–5)
ALP SERPL-CCNC: 54 U/L — SIGNIFICANT CHANGE UP (ref 40–120)
ALT FLD-CCNC: 13 U/L — SIGNIFICANT CHANGE UP (ref 10–45)
AMPHET UR-MCNC: POSITIVE
ANION GAP SERPL CALC-SCNC: 13 MMOL/L — SIGNIFICANT CHANGE UP (ref 5–17)
APAP SERPL-MCNC: <15 UG/ML — SIGNIFICANT CHANGE UP (ref 10–30)
APPEARANCE UR: CLEAR — SIGNIFICANT CHANGE UP
AST SERPL-CCNC: 13 U/L — SIGNIFICANT CHANGE UP (ref 10–40)
BARBITURATES UR SCN-MCNC: NEGATIVE — SIGNIFICANT CHANGE UP
BASOPHILS # BLD AUTO: 0.04 K/UL — SIGNIFICANT CHANGE UP (ref 0–0.2)
BASOPHILS NFR BLD AUTO: 0.4 % — SIGNIFICANT CHANGE UP (ref 0–2)
BENZODIAZ UR-MCNC: NEGATIVE — SIGNIFICANT CHANGE UP
BILIRUB SERPL-MCNC: 0.4 MG/DL — SIGNIFICANT CHANGE UP (ref 0.2–1.2)
BILIRUB UR-MCNC: NEGATIVE — SIGNIFICANT CHANGE UP
BUN SERPL-MCNC: 12 MG/DL — SIGNIFICANT CHANGE UP (ref 7–23)
CALCIUM SERPL-MCNC: 9.2 MG/DL — SIGNIFICANT CHANGE UP (ref 8.4–10.5)
CHLORIDE SERPL-SCNC: 106 MMOL/L — SIGNIFICANT CHANGE UP (ref 96–108)
CO2 SERPL-SCNC: 21 MMOL/L — LOW (ref 22–31)
COCAINE METAB.OTHER UR-MCNC: NEGATIVE — SIGNIFICANT CHANGE UP
COLOR SPEC: SIGNIFICANT CHANGE UP
CREAT SERPL-MCNC: 0.67 MG/DL — SIGNIFICANT CHANGE UP (ref 0.5–1.3)
DIFF PNL FLD: NEGATIVE — SIGNIFICANT CHANGE UP
EGFR: 116 ML/MIN/1.73M2 — SIGNIFICANT CHANGE UP
EOSINOPHIL # BLD AUTO: 0.04 K/UL — SIGNIFICANT CHANGE UP (ref 0–0.5)
EOSINOPHIL NFR BLD AUTO: 0.4 % — SIGNIFICANT CHANGE UP (ref 0–6)
ETHANOL SERPL-MCNC: <10 MG/DL — SIGNIFICANT CHANGE UP (ref 0–10)
GLUCOSE SERPL-MCNC: 93 MG/DL — SIGNIFICANT CHANGE UP (ref 70–99)
GLUCOSE UR QL: NEGATIVE — SIGNIFICANT CHANGE UP
HCG SERPL-ACNC: <2 MIU/ML — SIGNIFICANT CHANGE UP
HCT VFR BLD CALC: 42 % — SIGNIFICANT CHANGE UP (ref 34.5–45)
HGB BLD-MCNC: 13.4 G/DL — SIGNIFICANT CHANGE UP (ref 11.5–15.5)
IMM GRANULOCYTES NFR BLD AUTO: 0.2 % — SIGNIFICANT CHANGE UP (ref 0–0.9)
KETONES UR-MCNC: SIGNIFICANT CHANGE UP
LEUKOCYTE ESTERASE UR-ACNC: NEGATIVE — SIGNIFICANT CHANGE UP
LYMPHOCYTES # BLD AUTO: 2.33 K/UL — SIGNIFICANT CHANGE UP (ref 1–3.3)
LYMPHOCYTES # BLD AUTO: 25.1 % — SIGNIFICANT CHANGE UP (ref 13–44)
MCHC RBC-ENTMCNC: 27.7 PG — SIGNIFICANT CHANGE UP (ref 27–34)
MCHC RBC-ENTMCNC: 31.9 GM/DL — LOW (ref 32–36)
MCV RBC AUTO: 86.8 FL — SIGNIFICANT CHANGE UP (ref 80–100)
METHADONE UR-MCNC: NEGATIVE — SIGNIFICANT CHANGE UP
MONOCYTES # BLD AUTO: 0.56 K/UL — SIGNIFICANT CHANGE UP (ref 0–0.9)
MONOCYTES NFR BLD AUTO: 6 % — SIGNIFICANT CHANGE UP (ref 2–14)
NEUTROPHILS # BLD AUTO: 6.29 K/UL — SIGNIFICANT CHANGE UP (ref 1.8–7.4)
NEUTROPHILS NFR BLD AUTO: 67.9 % — SIGNIFICANT CHANGE UP (ref 43–77)
NITRITE UR-MCNC: NEGATIVE — SIGNIFICANT CHANGE UP
NRBC # BLD: 0 /100 WBCS — SIGNIFICANT CHANGE UP (ref 0–0)
OPIATES UR-MCNC: NEGATIVE — SIGNIFICANT CHANGE UP
OXYCODONE UR-MCNC: NEGATIVE — SIGNIFICANT CHANGE UP
PCP SPEC-MCNC: SIGNIFICANT CHANGE UP
PCP UR-MCNC: NEGATIVE — SIGNIFICANT CHANGE UP
PH UR: 5.5 — SIGNIFICANT CHANGE UP (ref 5–8)
PLATELET # BLD AUTO: 350 K/UL — SIGNIFICANT CHANGE UP (ref 150–400)
POTASSIUM SERPL-MCNC: 4.4 MMOL/L — SIGNIFICANT CHANGE UP (ref 3.5–5.3)
POTASSIUM SERPL-SCNC: 4.4 MMOL/L — SIGNIFICANT CHANGE UP (ref 3.5–5.3)
PROT SERPL-MCNC: 7.6 G/DL — SIGNIFICANT CHANGE UP (ref 6–8.3)
PROT UR-MCNC: NEGATIVE — SIGNIFICANT CHANGE UP
RBC # BLD: 4.84 M/UL — SIGNIFICANT CHANGE UP (ref 3.8–5.2)
RBC # FLD: 12 % — SIGNIFICANT CHANGE UP (ref 10.3–14.5)
SALICYLATES SERPL-MCNC: <2 MG/DL — LOW (ref 15–30)
SARS-COV-2 RNA SPEC QL NAA+PROBE: SIGNIFICANT CHANGE UP
SODIUM SERPL-SCNC: 140 MMOL/L — SIGNIFICANT CHANGE UP (ref 135–145)
SP GR SPEC: 1.03 — HIGH (ref 1.01–1.02)
THC UR QL: NEGATIVE — SIGNIFICANT CHANGE UP
UROBILINOGEN FLD QL: NEGATIVE — SIGNIFICANT CHANGE UP
WBC # BLD: 9.28 K/UL — SIGNIFICANT CHANGE UP (ref 3.8–10.5)
WBC # FLD AUTO: 9.28 K/UL — SIGNIFICANT CHANGE UP (ref 3.8–10.5)

## 2023-06-17 PROCEDURE — 90792 PSYCH DIAG EVAL W/MED SRVCS: CPT | Mod: 95

## 2023-06-17 PROCEDURE — 36415 COLL VENOUS BLD VENIPUNCTURE: CPT

## 2023-06-17 PROCEDURE — 80053 COMPREHEN METABOLIC PANEL: CPT

## 2023-06-17 PROCEDURE — 84702 CHORIONIC GONADOTROPIN TEST: CPT

## 2023-06-17 PROCEDURE — 87635 SARS-COV-2 COVID-19 AMP PRB: CPT

## 2023-06-17 PROCEDURE — 80307 DRUG TEST PRSMV CHEM ANLYZR: CPT

## 2023-06-17 PROCEDURE — 93005 ELECTROCARDIOGRAM TRACING: CPT

## 2023-06-17 PROCEDURE — 99285 EMERGENCY DEPT VISIT HI MDM: CPT

## 2023-06-17 PROCEDURE — 81003 URINALYSIS AUTO W/O SCOPE: CPT

## 2023-06-17 PROCEDURE — 85025 COMPLETE CBC W/AUTO DIFF WBC: CPT

## 2023-06-17 RX ORDER — ALPRAZOLAM 0.25 MG
0.5 TABLET ORAL ONCE
Refills: 0 | Status: DISCONTINUED | OUTPATIENT
Start: 2023-06-17 | End: 2023-06-17

## 2023-06-17 RX ADMIN — Medication 0.5 MILLIGRAM(S): at 19:30

## 2023-06-17 RX ADMIN — Medication 0.5 MILLIGRAM(S): at 21:46

## 2023-06-17 NOTE — ED PROVIDER NOTE - CLINICAL SUMMARY MEDICAL DECISION MAKING FREE TEXT BOX
Romeo Reza MD. pt with hx of depression, no pmhx, presents to ED for severe depression and SI with plan to OD intentionally on pills. pt denies other complaints at this time. pt does admit to MDA use. denies other illicit drugs. VSS. general: in no acute distress. normocephalic, atrumatic head. neck supple. no jvd. rrr nl s1/s2, no m/r/g. lungs cta. abd soft nt nd. b/l le no edema or ttp. palpable radial and dp/pt pulses, equal. A&ox3. neurologically intact, no focal deficits. skin warm and dry. psych: very tearful, anxious. is coherent and cooperative.    mdm: pt presenting with SI w/ plan. no somatic complaints at this time. will obtain medical screening labs: Will check CBC to eval WBC, anemia, plt count; CMP to eval for lyte abnormalities, renal and/or liver dysfunction. EKG. Etoh level. Will medically clear and then consult psychiatry. Dispo pending ED w/u and psych plans.

## 2023-06-17 NOTE — ED ADULT NURSE NOTE - NSFALLUNIVINTERV_ED_ALL_ED
Bed/Stretcher in lowest position, wheels locked, appropriate side rails in place/Call bell, personal items and telephone in reach/Instruct patient to call for assistance before getting out of bed/chair/stretcher/Non-slip footwear applied when patient is off stretcher/[place] to call system/Physically safe environment - no spills, clutter or unnecessary equipment/Purposeful proactive rounding/Room/bathroom lighting operational, light cord in reach

## 2023-06-17 NOTE — ED PROVIDER NOTE - ATTENDING CONTRIBUTION TO CARE
I, Romeo Reza, performed a history and physical exam of the patient and discussed their management with the resident and/or advanced care provider. I reviewed the resident and/or advanced care provider's note and agree with the documented findings and plan of care except where noted. I was present and available for all procedures.    see mdm

## 2023-06-17 NOTE — ED ADULT NURSE NOTE - NS_BH TRG QUESTION6_ED_ALL_ED
CHIEF COMPLAINT:   Patient presents with:  Eye Problem      HPI:   Yue Canales is a 77year old female who presents with concern contact lens is stuck in left eye. Symptoms began  1  days ago. Symptoms have been present since onset.    Patient bonnie Deric Cosby is a 77year old female who presents with:    ASSESSMENT:   Left eye complaint  (primary encounter diagnosis)  Uses contact lenses      Procedure    Risks of procedure discussed with patient; patient gave verbal consent.     Washed left · The only cure for an allergy is to avoid the substance (allergen) that causes it. · Eye drops and cold compresses can help reduce swelling. They can ease redness and itching. · Symptoms often get better if you use allergy eye drops.  Or if you limit you No

## 2023-06-17 NOTE — ED BEHAVIORAL HEALTH ASSESSMENT NOTE - SUMMARY
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 Patient reports several stressors, fear in living alone. At this time patient appears to meet criteria for inpatient psych admission. Patient is amenable, voluntary admission. 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 Patient reports several stressors, fear in living alone. At this time patient appears to meet criteria for inpatient psych admission for stabilization and safety. Patient is amenable to voluntary  admission.

## 2023-06-17 NOTE — ED BEHAVIORAL HEALTH NOTE - BEHAVIORAL HEALTH NOTE
===================     PRE-HOSPITAL COURSE     ===================     SOURCE: RN and secondhand ED documentation      DETAILS: Pt self-presented      ============     ED COURSE     ============     SOURCE: RN and secondhand ED documentation      ARRIVAL: Pt self-presented      BELONGINGS: No belongings of note. All belongings were provided to hospital security, and patient currently in a gown with a 1:1 staff member.     BEHAVIOR: Per RN, pt has been cooperative, slightly anxious and in behavioral control. RN reports pt presented for depression, and lack of family support due to her family living out of state. RN reports pt endorses passive SI, no plan or intent. RN reports pt is AOx4, linear thought process, good eye contact and fair grooming/hygiene.      TREATMENT: RN reports pt was given Xanax 0.5 mg PO at 19:30     VISITORS: Pt’s friend was at bedside earlier, however at this time pt is no longer accompanied by family or social supports

## 2023-06-17 NOTE — ED ADULT NURSE REASSESSMENT NOTE - NS ED NURSE REASSESS COMMENT FT1
received pt. anxious and tearful @ times, medicated w/ xanax 0.5mg pox 1 dose @ 1930. will continue to monitor for its effect. 1:1 CO for s/i maintained.

## 2023-06-17 NOTE — ED BEHAVIORAL HEALTH ASSESSMENT NOTE - HPI (INCLUDE ILLNESS QUALITY, SEVERITY, DURATION, TIMING, CONTEXT, MODIFYING FACTORS, ASSOCIATED SIGNS AND SYMPTOMS)
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 Telepsychiatry consulted for psych eval.     Upon approach patient appears tearful and distressed. She reports "I don't understand why I am living, I'm waking up with panic attacks everyday, with impending doom." She states "why am I even alive?" Patient reports that she has been feeling this way for several months. Reporting that she is an emotional wreck. She reports feeling hopeless. 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 Patient reports that she has tried meditation and prayer to cope but cant take it anymore. Patient has living alone, while her parents are out of town. She reports that she is having trouble in her two year relationship. She feeling burnt out. Patient reports that she has been having anxiety attacks about her existence. She reports that she is having thoughts of wanting to jump in front of a train, thought about flying out to another country to get euthanasia. She reports that she has been avoiding medicine but it is too late, "these thoughts are not good, I'm not feeling safe with myself." She reports that her therapist recommended that she gets a psych eval. Patient reports that she tried to get help through [facility] using an anxiety healing modality MDA and [place]. Patient has been chain smoking for the past two days. Patient has been sleeping ok. She reports gaining 20 pounds in 1.5 months.    ISTOP reviewed

## 2023-06-17 NOTE — ED BEHAVIORAL HEALTH ASSESSMENT NOTE - OTHER PAST PSYCHIATRIC HISTORY (INCLUDE DETAILS REGARDING ONSET, COURSE OF ILLNESS, INPATIENT/OUTPATIENT TREATMENT)
prev  admission at age 19, stopped citalopram in 2019 which was prescribed by PMD, has been taking  aderal 7.5 mg that was prescribed by neurologist who passed away a few months ago

## 2023-06-17 NOTE — ED BEHAVIORAL HEALTH ASSESSMENT NOTE - DETAILS
n/a yes older sister has schizophrenia and is on zyprexa, younger sister had mood disorder related to covid19 deferred as per hpi

## 2023-06-17 NOTE — ED ADULT NURSE REASSESSMENT NOTE - NS ED NURSE REASSESS COMMENT FT1
pt. signed voluntary status and she has been admitted to [facility], [facility], report given to STEPHEN Hernandez and [facility] was called for transport.

## 2023-06-17 NOTE — ED PROVIDER NOTE - OBJECTIVE STATEMENT
36 yo F no PMHx, past psychiatric history of Depression (was on Citalopram, no longer), ADHD on Adderall, is an OR nurse, presents to the ED via walk-in for SI and severe depression. Pt has been feeling depressed for months and now has thoughts of "not wanting to be here anymore" with intermittent plans of intentionally overdosing. Pt has been having stressors of increased arguments with boyfriend and being the '' of 'toxic parents'. Pt does admit to MDA use (states that a therapy group recommended it; last used 1 week ago). Pt otherwise denies other complaints. She denies a/v hallucinations or homicidal ideations. Denies fevers, chills, cough, abd pain, cp, sob.  No firearms in the house. Pt lives with parents.    Sister in [facility] ([phone]); Mother Mrs. Rainey ([phone])

## 2023-06-17 NOTE — ED ADULT NURSE NOTE - OBJECTIVE STATEMENT
37 year old female BIB self with SI; A&O; +SI, plan to OD; denies AVH; denies ETOH, uses "MDA as part of a guided meditation group, I am also prescribed Adderall"; denies Axis III. This is a pleasant, very tearful pt, compliant with protocols and valuables and clothes taken, CO begun. Pt states "I drove myself here. I am having an existential crisis and I am overwhelmed right now"; pt works as an operating room nurse and is well dressed, appears very high functioning; she is describing increasingly depressed mood and anhedonia beginning in November and exacerbated by problems with her BF and feeling not supported by her parents, she recently moved back home "since I was living alone and getting depressed but my parents fight and are not helping me right now I am expected to help them"; she states she last was admitted at age 19, no prior self harm, states she has a therapist "but what we talk about is just like opening up a wound'; continue to monitor.

## 2023-06-17 NOTE — ED PROVIDER NOTE - PROGRESS NOTE DETAILS
Romeo Reza MD. pt medically cleared at this time. tele psych consulted. will add on to their list for consults. Romeo Reza MD. pt eval'd by psych; recommend voluntary admit. pt agreeable. pt has bed at [place] [id number]

## 2023-06-18 ENCOUNTER — INPATIENT (INPATIENT)
Facility: HOSPITAL | Age: 38
LOS: 4 days | Discharge: HOME | End: 2023-06-23
Attending: PSYCHIATRY & NEUROLOGY | Admitting: PSYCHIATRY & NEUROLOGY
Payer: MEDICAID

## 2023-06-18 VITALS
RESPIRATION RATE: 18 BRPM | OXYGEN SATURATION: 98 % | SYSTOLIC BLOOD PRESSURE: 98 MMHG | TEMPERATURE: 98 F | HEART RATE: 81 BPM | DIASTOLIC BLOOD PRESSURE: 62 MMHG

## 2023-06-18 VITALS — TEMPERATURE: 98 F | WEIGHT: 154.98 LBS | HEIGHT: 66 IN

## 2023-06-18 DIAGNOSIS — N94.3 PREMENSTRUAL TENSION SYNDROME: ICD-10-CM

## 2023-06-18 DIAGNOSIS — Z98.890 OTHER SPECIFIED POSTPROCEDURAL STATES: Chronic | ICD-10-CM

## 2023-06-18 DIAGNOSIS — F32.A DEPRESSION, UNSPECIFIED: ICD-10-CM

## 2023-06-18 PROCEDURE — 90792 PSYCH DIAG EVAL W/MED SRVCS: CPT

## 2023-06-18 RX ORDER — DIPHENHYDRAMINE HCL 50 MG
50 CAPSULE ORAL ONCE
Refills: 0 | Status: DISCONTINUED | OUTPATIENT
Start: 2023-06-18 | End: 2023-06-23

## 2023-06-18 RX ORDER — DIPHENHYDRAMINE HCL 50 MG
50 CAPSULE ORAL EVERY 6 HOURS
Refills: 0 | Status: DISCONTINUED | OUTPATIENT
Start: 2023-06-18 | End: 2023-06-23

## 2023-06-18 RX ORDER — NICOTINE POLACRILEX 2 MG
2 GUM BUCCAL
Refills: 0 | Status: DISCONTINUED | OUTPATIENT
Start: 2023-06-18 | End: 2023-06-23

## 2023-06-18 RX ORDER — HALOPERIDOL DECANOATE 100 MG/ML
5 INJECTION INTRAMUSCULAR EVERY 6 HOURS
Refills: 0 | Status: DISCONTINUED | OUTPATIENT
Start: 2023-06-18 | End: 2023-06-23

## 2023-06-18 RX ORDER — HALOPERIDOL DECANOATE 100 MG/ML
5 INJECTION INTRAMUSCULAR ONCE
Refills: 0 | Status: DISCONTINUED | OUTPATIENT
Start: 2023-06-18 | End: 2023-06-23

## 2023-06-18 RX ORDER — SERTRALINE 25 MG/1
25 TABLET, FILM COATED ORAL DAILY
Refills: 0 | Status: DISCONTINUED | OUTPATIENT
Start: 2023-06-18 | End: 2023-06-19

## 2023-06-18 RX ADMIN — SERTRALINE 25 MILLIGRAM(S): 25 TABLET, FILM COATED ORAL at 09:44

## 2023-06-18 RX ADMIN — Medication 50 MILLIGRAM(S): at 20:04

## 2023-06-18 RX ADMIN — Medication 2 MILLIGRAM(S): at 20:04

## 2023-06-18 RX ADMIN — HALOPERIDOL DECANOATE 5 MILLIGRAM(S): 100 INJECTION INTRAMUSCULAR at 20:04

## 2023-06-18 RX ADMIN — Medication 2 MILLIGRAM(S): at 09:44

## 2023-06-18 NOTE — BH INPATIENT PSYCHIATRY ASSESSMENT NOTE - NSBHMSESPEECH_PSY_A_CORE
Alert-The patient is alert, awake and responds to voice. The patient is oriented to time, place, and person. 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 Normal volume, rate, productivity, spontaneity and articulation

## 2023-06-18 NOTE — BH INPATIENT PSYCHIATRY ASSESSMENT NOTE - NSBHATTESTATTENDBILLTIME_PSY_A_CORE
I attest my time as attending is greater than ROCIO time spent on qualifying patient care activities.

## 2023-06-18 NOTE — BH CHART NOTE - NSEVENTNOTEFT_PSY_ALL_CORE
HPI:    Patient evaluated by RITU.  On assessment, patient appears in distress, anxious, dysphoric, tearful, with tremors present; she reports that she has been experiencing symptoms of anxiety and depression including anhedonia, low energy, depressed mood, and suicidal ideation. She reveals that yesterday she and her boyfriend broke up she has felt distraught since. She states that the boyfriend told her that he doesn't feel safe around her and patient states that she was very surprised and confused to hear that. Patient states that she is in a group for plant medicine and started attending the group for healing. She reports that she recently discovered that the plant medicine used in the group contains MDA, and states that her last use was 1 week ago. Utox at [facility] was positive for amphetamine. Pt denies SI/HI.     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 Telepsychiatry consulted for psych eval.     Upon approach patient appears tearful and distressed. She reports "I don't understand why I am living, I'm waking up with panic attacks everyday, with impending doom." She states "why am I even alive?" Patient reports that she has been feeling this way for several months. Reporting that she is an emotional wreck. She reports feeling hopeless. 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 Patient reports that she has tried meditation and prayer to cope but cant take it anymore. Patient has living alone, while her parents are out of town. She reports that she is having trouble in her two year relationship. She feeling burnt out. Patient reports that she has been having anxiety attacks about her existence. She reports that she is having thoughts of wanting to jump in front of a train, thought about flying out to another country to get euthanasia. She reports that she has been avoiding medicine but it is too late, "these thoughts are not good, I'm not feeling safe with myself." She reports that her therapist recommended that she gets a psych eval. Patient reports that she tried to get help through Plant Medicine using an anxiety healing modality MDA and [place]. Patient has been chain smoking for the past two days. Patient has been sleeping ok. She reports gaining 20 pounds in 1.5 months.      PPH: prev  admission at age 19, stopped citalopram in 2019 which was prescribed by PMD, has been taking  aderal 7.5 mg that was prescribed by neurologist who passed away a few months ago. Previously on celexa.      PMH: none     Medications: Adderall 7.5 mg bid (patient wasn't taking everyday, only as needed)    Allergies: none    Substance: See HPI    Family Hx: older sister has schizophrenia and is on zyprexa, younger sister had mood disorder related to covid19    Social Hx: Smokes cigarettes, more frequent use recently.     Access to weapons/guns: none    Vitals:  T(F): 98.2 (23 @ 01:29), Max: 98.6 (23 @ 17:59)  HR: 81 (23 @ 00:28) (70 - 83)  BP: 98/62 (23 @ 00:28) (91/56 - 123/85)  RR: 18 (23 @ 00:28) (18 - 19)  SpO2: 98% (23 @ 00:28) (97% - 98%)    MSE:  Appearance: appears stated age, dressed casually, fair grooming and hygiene. Behavior: cooperative, appropriate eye contact, in good behavioral control. Motor: no PMR/PMA. tremor present. Speech: no increased latency, normal rate, tone, volume. TP: linear, goal-directed. TC: future-oriented. Denies SI/HI/I/P, no urges for self-harm. No apparent delusions. Denies AH/VH. Mood: "Anxious" Affect: Dysphoric, reactive, mood congruent, appropriate. Cognition: alert, oriented to person, place, month and year. Fund of knowledge: intact. Attention/Concentration: intact. Insight: fair. Judgment: fair. Impulse control: fair.    Labs:                        13.4   9.28  )-----------( 350      ( 2023 18:57 )             42.0         140  |  106  |  12  ----------------------------<  93  4.4   |  21<L>  |  0.67    Ca    9.2      2023 18:57    TPro  7.6  /  Alb  4.5  /  TBili  0.4  /  DBili  x   /  AST  13  /  ALT  13  /  AlkPhos  54      Urinalysis Basic - ( 2023 19:39 )    Color: Light Yellow / Appearance: Clear / S.028 / pH: x  Gluc: x / Ketone: Trace  / Bili: Negative / Urobili: Negative   Blood: x / Protein: Negative / Nitrite: Negative   Leuk Esterase: Negative / RBC: x / WBC x   Sq Epi: x / Non Sq Epi: x / Bacteria: x      Drug Screen W/PCP, Urine: Done (23 @ 19:39)  COVID-19 PCR: NotDetec (23 @ 18:58)      Risk Assessment: Immediate risk is minimized by inpatient admission to safe environment with appropriate supervision and limited access to lethal means. Futue risk to be minimized by treament of acute [INSERT HERE] symptoms, maximizing outpatient supports, providing patient education, discussing emergency procedures, and ensuring close follow-up.    Acute risk factors include: single, current SI/I/P, hopelessness/helplessness, severe anxiety, active mood episode, active substance use, acute psychosocial stressors, poor reactivity to stressors   Chronic risk factors for suicide include: h/o prior psychiatric admissions  Protective factors include: healthy, help-seeking behaviors, no legal history      Based on risk assessment evaluation, patient IS NOT at acute risk of harm to self or others at this time, DOES NOT require 1:1 CO.      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 Pt seen by Telepsychiatry and transferred to [facility].     Plan:  1.	Legals: admit on   2.	Safety: routine observation, denies SI/HI/I/P on the unit. PRNs: Ativan/Haldol/Benadryl PO/IM  3.	Psychiatry: Zoloft 25 mg daily (patient insistent that zoloft is started tomorrow), Will defer Adderall to primary team.   4.	Group, milieu, individual therapy as appropriate.  5.	Medical: no significant PMH, monitor for withdrawal symptoms from MDA.  Admission labs WNL.  6.	Dispo: pending clinical improvement.  Patient continues to require inpatient hospitalization for stabilization and safety.    Patient requires inpatient admission due to MDD

## 2023-06-18 NOTE — BH PATIENT PROFILE - STATED REASON FOR ADMISSION
" My anxiety, depression, panic attack getting worse over the last 3 months. life is not that easy for me, im in toxic relationship".

## 2023-06-18 NOTE — BH INPATIENT PSYCHIATRY ASSESSMENT NOTE - DETAILS
as per hpi deferred older sister has schizophrenia and is on zyprexa, younger sister had mood disorder related to covid19

## 2023-06-18 NOTE — BH INPATIENT PSYCHIATRY ASSESSMENT NOTE - NSBHCHARTREVIEWVS_PSY_A_CORE FT
Vital Signs Last 24 Hrs  T(C): 36.8 (06-18-23 @ 01:29), Max: 37 (06-17-23 @ 17:59)  T(F): 98.2 (06-18-23 @ 01:29), Max: 98.6 (06-17-23 @ 17:59)  HR: 81 (06-18-23 @ 00:28) (70 - 83)  BP: 98/62 (06-18-23 @ 00:28) (91/56 - 123/85)  BP(mean): --  RR: 18 (06-18-23 @ 00:28) (18 - 19)  SpO2: 98% (06-18-23 @ 00:28) (97% - 98%)    Orthostatic VS  06-18-23 @ 01:29  Lying BP: --/-- HR: --  Sitting BP: 109/73 HR: 78  Standing BP: 107/71 HR: 88  Site: --  Mode: --   Vital Signs Last 24 Hrs  T(C): 36.7 (06-18-23 @ 07:34), Max: 37 (06-17-23 @ 17:59)  T(F): 98.1 (06-18-23 @ 07:34), Max: 98.6 (06-17-23 @ 17:59)  HR: 81 (06-18-23 @ 00:28) (70 - 83)  BP: 98/62 (06-18-23 @ 00:28) (91/56 - 123/85)  BP(mean): --  RR: 18 (06-18-23 @ 00:28) (18 - 19)  SpO2: 98% (06-18-23 @ 00:28) (97% - 98%)    Orthostatic VS  06-18-23 @ 07:34  Lying BP: --/-- HR: --  Sitting BP: 107/69 HR: 72  Standing BP: --/-- HR: --  Site: --  Mode: --  Orthostatic VS  06-18-23 @ 01:29  Lying BP: --/-- HR: --  Sitting BP: 109/73 HR: 78  Standing BP: 107/71 HR: 88  Site: --  Mode: --

## 2023-06-18 NOTE — BH INPATIENT PSYCHIATRY ASSESSMENT NOTE - HPI (INCLUDE ILLNESS QUALITY, SEVERITY, DURATION, TIMING, CONTEXT, MODIFYING FACTORS, ASSOCIATED SIGNS AND SYMPTOMS)
Chart review and Patient was seen and evaluated, chart, medications and labs reviewed. Case discussed with nursing team.  On service for this 37 year old single female (recently broke up with boyfriend).  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  Patient has PPH of Mood Disorder, and Anxiety Disorder.   Patient has 1 prior inpatient hospitalization 18 years ago.  Patient is now hospitalized with a primary problem of worsening depression and anxiety, having SI with formulated plan to jump in front of train in context of recent break up with boyfriend.  Patient admitted to [facility]  on a 9.13 legal status. I have reviewed the initial psychiatric assessment in the electronic medical record, including the history of present illness, past psychiatric history, family/social history (no pertinent changes), and exam, and have confirmed the salient findings dated  23.  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 Telepsychiatry consulted for psych eval.  Upon approach patient appears tearful and distressed. She reports "I don't understand why I am living, I'm waking up with panic attacks everyday, with impending doom." She states "why am I even alive?" Patient reports that she has been feeling this way for several months. Reporting that she is an emotional wreck. She reports feeling hopeless. 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 Patient reports that she has tried meditation and prayer to cope but cant take it anymore. Patient has living alone, while her parents are out of town. She reports that she is having trouble in her two year relationship. She feeling burnt out. Patient reports that she has been having anxiety attacks about her existence. She reports that she is having thoughts of wanting to jump in front of a train, thought about flying out to another country to get euthanasia. She reports that she has been avoiding medicine but it is too late, "these thoughts are not good, I'm not feeling safe with myself." She reports that her therapist recommended that she gets a psych eval. Patient reports that she tried to get help through Plant Medicine using an anxiety healing modality MDA and [place]. Patient has been chain smoking for the past two days. Patient has been sleeping ok. She reports gaining 20 pounds in 1.5 months.    On unit: information came from chart review and patient interview     Chart review and Patient was seen and evaluated, chart, medications and labs reviewed. Case discussed with nursing team.  On service for this 37 year old single female (recently broke up with boyfriend).  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  Patient has PPH of Mood Disorder, and Anxiety Disorder.   Patient has 1 prior inpatient hospitalization 18 years ago.  Patient is now hospitalized with a primary problem of worsening depression and anxiety, having SI with formulated plan to jump in front of train in context of recent break up with boyfriend.  Patient admitted to [facility]  on a 9.13 legal status. I have reviewed the initial psychiatric assessment in the electronic medical record, including the history of present illness, past psychiatric history, family/social history (no pertinent changes), and exam, and have confirmed the salient findings dated  23.  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 Telepsychiatry consulted for psych eval.  Upon approach patient appears tearful and distressed. She reports "I don't understand why I am living, I'm waking up with panic attacks everyday, with impending doom." She states "why am I even alive?" Patient reports that she has been feeling this way for several months. Reporting that she is an emotional wreck. She reports feeling hopeless. 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 Patient reports that she has tried meditation and prayer to cope but cant take it anymore. Patient has living alone, while her parents are out of town. She reports that she is having trouble in her two year relationship. She feeling burnt out. Patient reports that she has been having anxiety attacks about her existence. She reports that she is having thoughts of wanting to jump in front of a train, thought about flying out to another country to get euthanasia. She reports that she has been avoiding medicine but it is too late, "these thoughts are not good, I'm not feeling safe with myself." She reports that her therapist recommended that she gets a psych eval. Patient reports that she tried to get help through Plant Medicine using an anxiety healing modality MDA and [place]. Patient has been chain smoking for the past two days. Patient has been sleeping ok. She reports gaining 20 pounds in 1.5 months.    On unit: information came from chart review and patient interview  Patient presents with depressed mood, anxiety and reports lacks support.  Patient presents tearful, sad.  Patient reports past history of depression for several years old.  Has 1 prior psychiatric admission 18 years ago. Denies past SA/SIB/HI.   More recently depressive symptoms in November,  however has worsened over the past recent weeks triggered by recent break up with boyfriend, responsibilities with caring for her parents.   In today’s interview patient reported that her mood is very sad, overwhelmed “I’ve been crying all day” Pt reports feeling “burnt out, I’ve been having  emotional outbursts ”  Patient reports depressive symptoms including: anhedonia, increased appetite (gained  20lbs in the past 2 months), feels hopeless/emptiness, increase crying episodes, fatigue, she lacks energy, reports small tasks take extra effort,  insomnia, at times poor concentration, excessive worrying, PMR, slowed thinking and movements.  Reports can be irritable, poor frustration tolerance, PMA. Reports recurrent fleeting thoughts of death, (suicidal thoughts with formulated plan to jump in front of train).    Patient reports her depressive symptoms had interfered with her thinking and functionality: not taking care of herself, decreased self -care and ADL, not eating regularly. Patient reported her symptoms were presents more days than none, for the past several weeks. Patient reports she has trouble doing normal day-to-day activities, and sometimes she feels as if life isn't worth living.  Denies any current suicidal thoughts, or negative thoughts to self-harm while on unit. No thoughts to harm others.  At time of interview patient denies SI/SIB/HI and engages in safety planning.    In regards to her anxiety patient reports she experiences daily anxiety with bouts of panic attacks  at 2x weekly (last panic attack was last week).  Reports panic attacks occurs frequently, lasting 15-20 minutes, can last for hours.  She experiences SOB, tightness in his chest, sweating, increased HR, feelings of impending doom. Patient reports she often gets depressed prior to her menstrual cycle.       Patient denies any hx or current AVH, delusions, paranoia, or psychotic disorganization, IOR, or magical thinking.  Patient denies any history or current symptoms of lenard: (no racing thoughts/ increased goal directed activities, impulsive, increased spending, feeling elated, pressured speech, elevated mood, increased in energy level causing sleep disruption).    Patient reports  family history of mental illness in primary degree relative (identifies brother with ADHD/ADD). Denies history of aggression/violence (but then reports she was aggressive with her boyfriend a few weeks ago). No history of arson/fire setting.  No access to firearm. no signs of catatonia. She denies obsessive, intrusive and persistent thoughts or compulsive, ritualistic acts are reported.   Patient reports no legal issues, is not currently under any kind of court supervision. Denies substance abuse, no vaping/tobacco, no alcohol. Admitting tox screen +amphetamine. Pt reports she is in a group for plant medicine and started attending the group for healing. She reports that she recently discovered that the plant medicine used in the group contains MDA, and states that her last use was 1 week ago. Patient denies past sexual trauma. PMH: h/o nasal septopalsty               Chart review and Patient was seen and evaluated, chart, medications and labs reviewed. Case discussed with nursing team.  On service for this 37 year old single female (recently broke up with boyfriend).  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  Patient has PPH of Mood Disorder, and Anxiety Disorder.   Patient has 1 prior inpatient hospitalization 18 years ago.  Patient is now hospitalized with a primary problem of worsening depression and anxiety, having SI with formulated plan to jump in front of train in context of recent break up with boyfriend.  Patient admitted to [facility]  on a 9.13 legal status. I have reviewed the initial psychiatric assessment in the electronic medical record, including the history of present illness, past psychiatric history, family/social history (no pertinent changes), and exam, and have confirmed the salient findings dated  23.  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 Telepsychiatry consulted for psych eval.  Upon approach patient appears tearful and distressed. She reports "I don't understand why I am living, I'm waking up with panic attacks everyday, with impending doom." She states "why am I even alive?" Patient reports that she has been feeling this way for several months. Reporting that she is an emotional wreck. She reports feeling hopeless. 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 Patient reports that she has tried meditation and prayer to cope but cant take it anymore. Patient has living alone, while her parents are out of town. She reports that she is having trouble in her two year relationship. She feeling burnt out. Patient reports that she has been having anxiety attacks about her existence. She reports that she is having thoughts of wanting to jump in front of a train, thought about flying out to another country to get euthanasia. She reports that she has been avoiding medicine but it is too late, "these thoughts are not good, I'm not feeling safe with myself." She reports that her therapist recommended that she gets a psych eval. Patient reports that she tried to get help through Plant Medicine using an anxiety healing modality MDA and [place]. Patient has been chain smoking for the past two days. Patient has been sleeping ok. She reports gaining 20 pounds in 1.5 months.    On unit: information came from chart review and patient interview  Patient presents with depressed mood, anxiety and reports lacks support.  Patient presents tearful, sad.  Patient reports past history of depression for several years old.  Has 1 prior psychiatric admission 18 years ago. Denies past SA/SIB/HI.   More recently depressive symptoms in November,  however has worsened over the past recent weeks triggered by recent break up with boyfriend, responsibilities with caring for her parents.   In today’s interview patient reported that her mood is very sad, overwhelmed “I’ve been crying all day” Pt reports feeling “burnt out, I’ve been having  emotional outbursts ”  Patient reports depressive symptoms including: anhedonia, increased appetite (gained  20lbs in the past 2 months), feels hopeless/emptiness, increase crying episodes, fatigue, she lacks energy, reports small tasks take extra effort,  insomnia, at times poor concentration, excessive worrying, PMR, slowed thinking and movements.  Reports can be irritable, poor frustration tolerance, PMA. Reports recurrent fleeting thoughts of death, (suicidal thoughts with formulated plan to jump in front of train).    Patient reports her depressive symptoms had interfered with her thinking and functionality: not taking care of herself, decreased self -care and ADL, not eating regularly. Patient reported her symptoms were presents more days than none, for the past several weeks. Patient reports she has trouble doing normal day-to-day activities, and sometimes she feels as if life isn't worth living.  Denies any current suicidal thoughts, or negative thoughts to self-harm while on unit. No thoughts to harm others.  At time of interview patient denies SI/SIB/HI and engages in safety planning.    In regards to her anxiety patient reports she experiences daily anxiety with bouts of panic attacks  at 2x weekly (last panic attack was last week).  Reports panic attacks occurs frequently, lasting 15-20 minutes, can last for hours.  She experiences SOB, tightness in his chest, sweating, increased HR, feelings of impending doom. Patient reports she often gets depressed prior to her menstrual cycle.       Patient denies any hx or current AVH, delusions, paranoia, or psychotic disorganization, IOR, or magical thinking.  Patient denies any history or current symptoms of lenard: (no racing thoughts/ increased goal directed activities, impulsive, increased spending, feeling elated, pressured speech, elevated mood, increased in energy level causing sleep disruption).    Patient reports  family history of mental illness in primary degree relative (identifies brother with ADHD/ADD). Denies history of aggression/violence (but then reports she was aggressive with her boyfriend a few weeks ago). No history of arson/fire setting.  No access to firearm. no signs of catatonia. She denies obsessive, intrusive and persistent thoughts or compulsive, ritualistic acts are reported.   Patient reports no legal issues, is not currently under any kind of court supervision. Denies substance abuse, no vaping/tobacco, no alcohol. Admitting tox screen +amphetamine. Pt reports she is in a group for plant medicine and started attending the group for healing. She reports that she recently discovered that the plant medicine used in the group contains MDA, and states that her last use was 1 week ago. Patient denies past sexual trauma. PMH: h/o nasal septopalsty

## 2023-06-18 NOTE — BH INPATIENT PSYCHIATRY ASSESSMENT NOTE - CURRENT MEDICATION
MEDICATIONS  (STANDING):  sertraline 25 milliGRAM(s) Oral daily    MEDICATIONS  (PRN):  diphenhydrAMINE 50 milliGRAM(s) Oral every 6 hours PRN Agitation  diphenhydrAMINE Injectable 50 milliGRAM(s) IntraMuscular once PRN Agitation  haloperidol     Tablet 5 milliGRAM(s) Oral every 6 hours PRN Agitation  haloperidol    Injectable 5 milliGRAM(s) IntraMuscular once PRN Agitation  LORazepam     Tablet 2 milliGRAM(s) Oral every 6 hours PRN Anxiety/Agitation  LORazepam   Injectable 2 milliGRAM(s) IntraMuscular once PRN Agitation  nicotine  Polacrilex Gum 2 milliGRAM(s) Oral every 2 hours PRN Nicotine Dependence

## 2023-06-18 NOTE — BH INPATIENT PSYCHIATRY ASSESSMENT NOTE - NSBHATTESTBILLING_PSY_A_CORE
[id number] diagnostic evaluation with medical services [id number] OBS or IP - moderate complexity OR 35-49 mins

## 2023-06-18 NOTE — BH INPATIENT PSYCHIATRY ASSESSMENT NOTE - NSBHASSESSSUMMFT_PSY_ALL_CORE
37 year old single female (recently broke up with boyfriend).  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  Patient has PPH of Mood Disorder, and Anxiety Disorder.   Patient has 1 prior inpatient hospitalization 18 years ago.  Patient is now hospitalized with a primary problem of worsening depression and anxiety, having SI with formulated plan to jump in front of train in context of recent break up with boyfriend.  Patient admitted to [facility]  on a 9.13 legal status. This patient requires inpatient hospitalization due to symptoms of mental illness so severe that they significantly interfere with activities of daily living, and presents a potential danger to self as a result of acute decompensation with active SI, thoughts to self harm with formulated plan. She is requiring 24-hour care at this time as a result, for psychiatric stabilization and safety.    Plan:  1.	Legals: admit on 9.13  2.	Safety: routine observation, denies SI/HI/I/P on the unit. PRNs: Ativan/Haldol/Benadryl PO/IM  3.	Psychiatry: Zoloft 25 mg daily.  Will defer Adderall to primary team.   4.	Group, milieu, individual therapy as appropriate.  5.	Medical: no significant PMH, monitor for withdrawal symptoms from MDA.  Admission labs WNL.  6.	Dispo: pending clinical improvement.  Patient continues to require inpatient hospitalization for stabilization and safety.

## 2023-06-18 NOTE — PSYCHIATRIC REHAB INITIAL EVALUATION - NSBHPRRECOMMEND_PSY_ALL_CORE
Writer attempted to meet with patient to orient her to Psychiatric Rehabilitation department services/staff and to assess functioning. Patient will be provided with a unit schedule and encouraged to attend daily psychiatric rehabilitation groups for improved insight and symptom management. Patient requested to delay initial assessment due to fatigue during writer’s first attempt at interview; writer was unable to wake her from sleep during second attempt. The following information pertaining to psychiatric rehabilitation needs is based upon chart records.    Patient presented to ED, complaining of suicidal ideation due to worsening depression within the context of feeling overwhelmed by several psychosocial stressors. Patient reported having to care for her parents, increase in panic attacks which she states have become weekly, and recent dissolution of a 2-year intimate relationship as contributors for admission. Patient disclosed low mood, low energy, anhedonia, and disordered eating with 20 lb weight gain within approximately 2 months.     Patient has history of mood disorder/depressive symptoms and she denied hx of lenard or psychosis. Patient denied any active psychiatric medication subscriptions and she disclosed treating herself with “plant medicine”, stating that she stopped when she discovered that the treatment substance included MDMA. Patient admitted to lack of social supports and stated that parents, with home she is domiciled, are out of town at present.     Patient is employed as a nurse. As patient was unable to collaborate on choosing an appropriate psychiatric rehabilitation goal, writer has selected one for her. Psychiatric rehabilitation staff will continue to engage patient to develop therapeutic rapport and to encourage patient to actively participate in her psychiatric rehabilitation treatment.      Psychiatric Rehabilitation recommends that patient identify 2 effective coping methods to better meet her needs for improved symptom management and insight by day 7. Department staff will provide supportive counseling/motivational interviewing and daily group therapy to assist patient in achieving therapeutic goals.

## 2023-06-18 NOTE — ED ADULT NURSE REASSESSMENT NOTE - NS ED NURSE REASSESS COMMENT FT1
pt. was transferred to [facility] on a voluntary status. final report to STEPHEN Hernandez @ [facility] regarding pt's current behavior. pt. was cooperative w/ transfer.

## 2023-06-18 NOTE — BH INPATIENT PSYCHIATRY ASSESSMENT NOTE - ATTENDING COMMENTS
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  Pt very tearful during the interview, worsening mood, increased anxiety,  with recent active SI (jump in front of the train). Pt at this time denies any active SI/I/P and reports passive SI ("wishes she wouldn't be here"). Pt requested voluntary psych hospitalization for stabilization. Given recent mood and SI, inpt hospitalization is an appropriate level of care. No 1:1 needed at this time as she is denying active SI and is able to make her needs known.

## 2023-06-19 LAB
A1C WITH ESTIMATED AVERAGE GLUCOSE RESULT: 4.6 % — SIGNIFICANT CHANGE UP (ref 4–5.6)
CHOLEST SERPL-MCNC: 173 MG/DL — SIGNIFICANT CHANGE UP
ESTIMATED AVERAGE GLUCOSE: 85 — SIGNIFICANT CHANGE UP
HDLC SERPL-MCNC: 62 MG/DL — SIGNIFICANT CHANGE UP
LIPID PNL WITH DIRECT LDL SERPL: 102 MG/DL — HIGH
NON HDL CHOLESTEROL: 111 MG/DL — SIGNIFICANT CHANGE UP
TRIGL SERPL-MCNC: 47 MG/DL — SIGNIFICANT CHANGE UP

## 2023-06-19 PROCEDURE — 90832 PSYTX W PT 30 MINUTES: CPT

## 2023-06-19 PROCEDURE — 99231 SBSQ HOSP IP/OBS SF/LOW 25: CPT

## 2023-06-19 RX ORDER — SERTRALINE 25 MG/1
50 TABLET, FILM COATED ORAL DAILY
Refills: 0 | Status: DISCONTINUED | OUTPATIENT
Start: 2023-06-20 | End: 2023-06-20

## 2023-06-19 RX ADMIN — SERTRALINE 25 MILLIGRAM(S): 25 TABLET, FILM COATED ORAL at 09:19

## 2023-06-19 RX ADMIN — Medication 50 MILLIGRAM(S): at 20:37

## 2023-06-19 RX ADMIN — Medication 2 MILLIGRAM(S): at 21:17

## 2023-06-19 NOTE — BH SOCIAL WORK INITIAL PSYCHOSOCIAL EVALUATION - OTHER PAST PSYCHIATRIC HISTORY (INCLUDE DETAILS REGARDING ONSET, COURSE OF ILLNESS, INPATIENT/OUTPATIENT TREATMENT)
Patient is a 37 year old female who lives with her parents, father, Curtis Rader, [phone]. She is a nurse. 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 She reports that she has had low energy and mood with hopelessness, increased appetite with weight gain, increased tearfulness and irritability, and thoughts to jump in front of a train. She was tearful in the ED. She had an episode of aggression towards her boyfriend several weeks ago and he subsequently broke up with her, stating he did not feel safe around her. She became increasingly tearful, despondent, hopeless, and, ultimately, suicidal. She believes now that the plant medication is not right for her. She has no reported history of SIB, substance abuse, legal issues, or aggression prior to this. The patient feels overwhelmed by her responsibilities including helping her parents. She remains tearful and hopeless. The patient will need a referral for appropriate outpatient treatment. There is no insurance information available to this writer at this time.

## 2023-06-19 NOTE — BH INPATIENT PSYCHIATRY PROGRESS NOTE - NSBHCHARTREVIEWVS_PSY_A_CORE FT
Vital Signs Last 24 Hrs  T(C): --  T(F): --  HR: --  BP: --  BP(mean): --  RR: --  SpO2: --    Orthostatic VS  06-18-23 @ 07:34  Lying BP: --/-- HR: --  Sitting BP: 107/69 HR: 72  Standing BP: --/-- HR: --  Site: --  Mode: --  Orthostatic VS  06-18-23 @ 01:29  Lying BP: --/-- HR: --  Sitting BP: 109/73 HR: 78  Standing BP: 107/71 HR: 88  Site: --  Mode: --

## 2023-06-19 NOTE — BH INPATIENT PSYCHIATRY PROGRESS NOTE - MSE UNSTRUCTURED FT
The patient appears stated age, with good hygiene, and is dressed appropriately.  She was calm and cooperative with the interview.  She maintained appropriate eye contact.  No restlessness or slowing of movements observed.  Gait is steady.  The patient’s speech was fluent, normal in tone, rate, and volume.  The patient’s mood is “depressed.”  Her affect is constricted, tearful at times, stable, appropriate.  The patient’s thoughts are goal directed.  She does not have any delusions or hallucinations.  She does not have any current suicidal or homicidal ideation, intent, or plan.  Insight is fair.  Judgment is fair.  Impulse control has been good on the unit.

## 2023-06-19 NOTE — BH INPATIENT PSYCHIATRY PROGRESS NOTE - NSBHFUPINTERVALHXFT_PSY_A_CORE
Patient seen for follow up of depression, SI, chart reviewed, and case discussed with treatment team.  No events reported overnight.  The patient states she has been feeling more depressed over the past few months mostly due to stress of dealing with her family.  She has also been arguing with her boyfriend, and then over the weekend, they broke up, which really upset her.  The patient admits to depressed mood, anhedonia, feelings of worthlessness, hopelessness, with some SI prior to admission.  Patient states her thoughts were mostly passive, denies any active SI.  No history of attempts.  The patient denies any lenard or psychosis.  She admits to having been using "medication" from an herbal group which she was going to and admits she doesn't really know what she was taking.  She was open to starting antidepressant, and Zoloft was started over the weekend.  She denies any side effects.  Agreeable to continuing to increase.  Education provided.  The patient has been visible on the unit, social with select peers, and attending groups.  The patient reports eating and sleeping well.  She has been compliant with medications, tolerating them well.

## 2023-06-19 NOTE — CHART NOTE - NSCHARTNOTEFT_GEN_A_CORE
Screening Medical Evaluation    Patient Admitted from: [facility] ED    [facility] admitting diagnosis: Depression      PAST MEDICAL & SURGICAL HISTORY:  Depression  H/O nasal septoplasty    ALLERGIES:  No Known Allergies    SOCIAL HISTORY:  Patient states she has been "chain smoking" for 2 days before she was admitted. Patient denies tobacco/nicotine use, alcohol, or other illicit substances    FAMILY HISTORY:  No known pertinent family history in 1st degree relatives      MEDICATIONS  (STANDING):    MEDICATIONS  (PRN):  diphenhydrAMINE 50 milliGRAM(s) Oral every 6 hours PRN Agitation  diphenhydrAMINE Injectable 50 milliGRAM(s) IntraMuscular once PRN Agitation  haloperidol     Tablet 5 milliGRAM(s) Oral every 6 hours PRN Agitation  haloperidol    Injectable 5 milliGRAM(s) IntraMuscular once PRN Agitation  LORazepam     Tablet 2 milliGRAM(s) Oral every 6 hours PRN Anxiety/Agitation  LORazepam   Injectable 2 milliGRAM(s) IntraMuscular once PRN Agitation  nicotine  Polacrilex Gum 2 milliGRAM(s) Oral every 2 hours PRN Nicotine Dependence      Vital Signs Last 24 Hrs  T(F): 97.6 (19 Jun 2023 20:47)  HR: 83 (19 Jun 2023 20:47)  BP: 105/60 (19 Jun 2023 20:47)  RR: --  SpO2: --      PHYSICAL EXAM:  GENERAL: NAD  HEAD:  Atraumatic, Normocephalic  EYES: Conjunctiva and sclera clear  NECK: Supple, No JVD  CHEST/LUNG: Clear to auscultation bilaterally; No wheeze  HEART: Regular rate and rhythm; No murmurs, rubs, or gallops  ABDOMEN: Soft, Nontender, Nondistended; Bowel sounds present  EXTREMITIES:  2+ Peripheral Pulses, No clubbing, cyanosis, or edema  NEUROLOGY: non-focal  SKIN: No rashes or lesions      Assessment and Plan:  37F admitted to [facility] for Depression w/ no significant PMHx seen at bedside for medical screening evaluation. Patient has no acute complaints at this time. Patient denies fever, chills, headache, dizziness, lightheadedness, N/V, SOB, cough, congestion, chest pain, abdominal pain, dysuria, hematuria, diarrhea, constipation. Physical exam unremarkable, VSS. CBC, CMP WNl. Utox positive for amphetamines. EKG NSR @ 63bpm    1.) Depression: Refer to primary team documentation for recs  2.)  3.)

## 2023-06-19 NOTE — BH INPATIENT PSYCHIATRY PROGRESS NOTE - NSBHASSESSSUMMFT_PSY_ALL_CORE
37 year old single female (recently broke up with boyfriend).  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  Patient has PPH of Mood Disorder, and Anxiety Disorder.   Patient has 1 prior inpatient hospitalization 18 years ago.  Patient is now hospitalized with a primary problem of worsening depression and anxiety, having SI with formulated plan to jump in front of train in context of recent break up with boyfriend.  Patient admitted to [facility]  on a 9.13 legal status. This patient requires inpatient hospitalization due to symptoms of mental illness so severe that they significantly interfere with activities of daily living, and presents a potential danger to self as a result of acute decompensation with active SI, thoughts to self harm with formulated plan. She is requiring 24-hour care at this time as a result, for psychiatric stabilization and safety.    The patient continues to be depressed, tearful at times, but is open to medications.  She was encouraged to attend groups.  She later submitted a 3-day letter for discharge.  Tolerating Zoloft well, wull increase to 50mg daily.    Plan:  1.	Legals: admit on 9.13  2.	Safety: routine observation, denies SI/HI/I/P on the unit. PRNs: Ativan/Haldol/Benadryl PO/IM  3.	Psychiatry: increase Zoloft to 50 mg daily.  4.	Group, milieu, individual therapy as appropriate.  5.	Medical: no significant PMH, monitor for withdrawal symptoms from MDA.  Admission labs WNL.  6.	Dispo: pending clinical improvement.  Patient continues to require inpatient hospitalization for stabilization and safety.

## 2023-06-20 PROCEDURE — 99232 SBSQ HOSP IP/OBS MODERATE 35: CPT

## 2023-06-20 RX ORDER — ARIPIPRAZOLE 15 MG/1
5 TABLET ORAL AT BEDTIME
Refills: 0 | Status: DISCONTINUED | OUTPATIENT
Start: 2023-06-20 | End: 2023-06-22

## 2023-06-20 RX ADMIN — ARIPIPRAZOLE 5 MILLIGRAM(S): 15 TABLET ORAL at 20:54

## 2023-06-20 RX ADMIN — Medication 50 MILLIGRAM(S): at 19:48

## 2023-06-20 RX ADMIN — Medication 2 MILLIGRAM(S): at 16:41

## 2023-06-20 RX ADMIN — SERTRALINE 50 MILLIGRAM(S): 25 TABLET, FILM COATED ORAL at 08:20

## 2023-06-20 NOTE — BH INPATIENT PSYCHIATRY PROGRESS NOTE - MSE UNSTRUCTURED FT
The patient appears stated age, with good hygiene, and is dressed appropriately.  She was calm and cooperative with the interview.  She maintained appropriate eye contact.  No restlessness or slowing of movements observed.  Gait is steady.  The patient’s speech was fluent, normal in tone, rate, and volume.  The patient’s mood is “depressed.”  Her affect is constricted, tearful at times, stable, appropriate.  The patient’s thoughts are goal directed.  She does not have any delusions or hallucinations.  She does not have any current suicidal or homicidal ideation, intent, or plan.  Insight is fair.  Judgment is fair.  Impulse control has been good on the unit. The patient appears stated age, with good hygiene, and is dressed appropriately.  She was calm and cooperative with the interview.  She maintained appropriate eye contact.  No restlessness or slowing of movements observed.  Gait is steady.  The patient’s speech was fluent, normal in tone, rate, and volume.  The patient’s mood is “depressed and my family is too dependent on me... I am the helper.”  Her affect is constricted, tearful at times, stable, mostly appropriate.  The patient’s thoughts are goal directed.  She does not have any delusions or hallucinations.  She does not have any current suicidal or homicidal ideation, intent, or plan.  Insight is fair.  Judgment is fair.  Impulse control has been good on the unit. Reliability; fair  VSS

## 2023-06-20 NOTE — BH INPATIENT PSYCHIATRY PROGRESS NOTE - NSBHCHARTREVIEWVS_PSY_A_CORE FT
Vital Signs Last 24 Hrs  T(C): 35.7 (06-20-23 @ 07:41), Max: 36.4 (06-19-23 @ 23:50)  T(F): 96.2 (06-20-23 @ 07:41), Max: 97.5 (06-19-23 @ 23:50)  HR: --  BP: --  BP(mean): --  RR: --  SpO2: --    Orthostatic VS  06-20-23 @ 07:41  Lying BP: --/-- HR: --  Sitting BP: 99/62 HR: 78  Standing BP: --/-- HR: --  Site: --  Mode: --  Orthostatic VS  06-19-23 @ 23:50  Lying BP: --/-- HR: --  Sitting BP: 110/62 HR: 84  Standing BP: 102/60 HR: 88  Site: --  Mode: --  Orthostatic VS  06-19-23 @ 20:47  Lying BP: --/-- HR: --  Sitting BP: 105/60 HR: 83  Standing BP: 96/61 HR: 87  Site: --  Mode: --   Vital Signs Last 24 Hrs  T(C): 35.7 (06-20-23 @ 07:41), Max: 35.7 (06-20-23 @ 07:41)  T(F): 96.2 (06-20-23 @ 07:41), Max: 96.2 (06-20-23 @ 07:41)  HR: --  BP: --  BP(mean): --  RR: --  SpO2: --    Orthostatic VS  06-20-23 @ 07:41  Lying BP: --/-- HR: --  Sitting BP: 99/62 HR: 78  Standing BP: --/-- HR: --  Site: --  Mode: --  Orthostatic VS  06-19-23 @ 23:50  Lying BP: --/-- HR: --  Sitting BP: 110/62 HR: 84  Standing BP: 102/60 HR: 88  Site: --  Mode: --  Orthostatic VS  06-19-23 @ 20:47  Lying BP: --/-- HR: --  Sitting BP: 105/60 HR: 83  Standing BP: 96/61 HR: 87  Site: --  Mode: --

## 2023-06-20 NOTE — BH INPATIENT PSYCHIATRY PROGRESS NOTE - NSBHASSESSSUMMFT_PSY_ALL_CORE
37 year old single female (recently broke up with boyfriend).  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  Patient has PPH of Mood Disorder, and Anxiety Disorder.   Patient has 1 prior inpatient hospitalization 18 years ago.  Patient is now hospitalized with a primary problem of worsening depression and anxiety, having SI with formulated plan to jump in front of train in context of recent break up with boyfriend.  Patient admitted to [facility]  on a 9.13 legal status. This patient requires inpatient hospitalization due to symptoms of mental illness so severe that they significantly interfere with activities of daily living, and presents a potential danger to self as a result of acute decompensation with active SI, thoughts to self harm with formulated plan. She is requiring 24-hour care at this time as a result, for psychiatric stabilization and safety.    The patient continues to be depressed, tearful at times, but is open to medications.  She was encouraged to attend groups.  She later submitted a 3-day letter for discharge.  Tolerating Zoloft well, wull increase to 50mg daily.    Plan:  1.	Legals: admit on 9.13  2.	Safety: routine observation, denies SI/HI/I/P on the unit. PRNs: Ativan/Haldol/Benadryl PO/IM  3.	Psychiatry: increase Zoloft to 50 mg daily.  4.	Group, milieu, individual therapy as appropriate.  5.	Medical: no significant PMH, monitor for withdrawal symptoms from MDA.  Admission labs WNL.  6.	Dispo: pending clinical improvement.  Patient continues to require inpatient hospitalization for stabilization and safety.   37 year old single female (recently broke up with boyfriend).  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  Patient has PPH of Mood Disorder, and Anxiety Disorder.   Patient has 1 prior inpatient hospitalization 18 years ago.  Patient is now hospitalized with a primary problem of worsening depression and anxiety, having SI with formulated plan to jump in front of train in context of recent break up with boyfriend.  Patient admitted to [facility]  on a 9.13 legal status. This patient requires inpatient hospitalization due to symptoms of mental illness so severe that they significantly interfere with activities of daily living, and presents a potential danger to self as a result of acute decompensation with active SI, thoughts to self harm with formulated plan. She is requiring 24-hour care at this time as a result, for psychiatric stabilization and safety.    The patient continues to be depressed, tearful at times, but is open to medications.  She was encouraged to attend groups.  She later submitted a 3-day letter for discharge.  Tolerating Zoloft well, wull increase to 50mg daily.    Plan:  1.	Legals: admit on 9.13  2.	Safety: routine observation, denies SI/HI/I/P on the unit. PRNs: Ativan/Haldol/Benadryl PO/IM  3.	Psychiatry: increase Zoloft to 50 mg daily-- stopped for trial of mood stabilizer:  start abilify 5 mg PO qhs;  can consider AWLTER for safety and compliance if needed  4.	Group, milieu, individual therapy as appropriate.  5.	Medical: no significant PMH, monitor for withdrawal symptoms from MDA.  Admission labs WNL.  6.	Dispo: pending clinical improvement.  Patient continues to require inpatient hospitalization for stabilization and safety.

## 2023-06-20 NOTE — BH INPATIENT PSYCHIATRY PROGRESS NOTE - NSBHFUPINTERVALHXFT_PSY_A_CORE
Patient seen for follow up of depression, SI, chart reviewed, and case discussed with treatment team.  No events reported overnight.  The patient states she has been feeling more depressed over the past few months mostly due to stress of dealing with her family.  She has also been arguing with her boyfriend, and then over the weekend, they broke up, which really upset her.  The patient admits to depressed mood, anhedonia, feelings of worthlessness, hopelessness, with some SI prior to admission.  Patient states her thoughts were mostly passive, denies any active SI.  No history of attempts.  The patient denies any lenard or psychosis.  She admits to having been using "medication" from an herbal group which she was going to and admits she doesn't really know what she was taking.  She was open to starting antidepressant, and Zoloft was started over the weekend.  She denies any side effects.  Agreeable to continuing to increase.  Education provided.  The patient has been visible on the unit, social with select peers, and attending groups.  The patient reports eating and sleeping well.  She has been compliant with medications, tolerating them well. Patient seen for follow up of depression, SI, chart reviewed, and case discussed with treatment team.  No events reported overnight.  ED note duly noted and appreciated. The patient states she has been feeling more depressed over the past few months mostly due to stress of dealing with her family.  She has also been arguing with her boyfriend, and then over the weekend, they broke up, which really upset her.  The patient admits to depressed mood, anhedonia, feelings of worthlessness, hopelessness, with some SI prior to admission.  Patient states her thoughts were mostly passive, denies any active SI.  No history of attempts.  The patient denies any lenard or psychosis.  She admits to having been using "medication" from an herbal group which she was going to and admits she doesn't really know what she was taking.  She was open to starting antidepressant, and Zoloft was started over the weekend.  She denies any side effects.  Agreeable to continuing to increase.  Education provided.  The patient has been visible on the unit, social with select peers, and attending groups.  The patient reports eating and sleeping well.  Reports a prior admit at age 19 and was told she had bipolar but seems to think this means hourly mood changes and also notes regular premenstrual mood changes. She has been compliant with medications, tolerating them well.

## 2023-06-21 PROCEDURE — 90853 GROUP PSYCHOTHERAPY: CPT

## 2023-06-21 PROCEDURE — 99233 SBSQ HOSP IP/OBS HIGH 50: CPT

## 2023-06-21 PROCEDURE — 90834 PSYTX W PT 45 MINUTES: CPT | Mod: 59

## 2023-06-21 RX ADMIN — Medication 50 MILLIGRAM(S): at 19:59

## 2023-06-21 RX ADMIN — HALOPERIDOL DECANOATE 5 MILLIGRAM(S): 100 INJECTION INTRAMUSCULAR at 21:00

## 2023-06-21 RX ADMIN — ARIPIPRAZOLE 5 MILLIGRAM(S): 15 TABLET ORAL at 19:59

## 2023-06-21 RX ADMIN — Medication 1 MILLIGRAM(S): at 17:58

## 2023-06-21 NOTE — BH INPATIENT PSYCHIATRY PROGRESS NOTE - NSBHFUPINTERVALHXFT_PSY_A_CORE
Patient seen for follow up of depression, SI, chart reviewed, and case discussed with treatment team.  No events reported overnight.  ED note duly noted and appreciated. The patient states she has been feeling more depressed over the past few months mostly due to stress of dealing with her family.  She has also been arguing with her boyfriend, and then over the weekend, they broke up, which really upset her.  The patient admits to depressed mood, anhedonia, feelings of worthlessness, hopelessness, with some SI prior to admission.  Patient states her thoughts were mostly passive, denies any active SI.  No history of attempts.  The patient denies any lenard or psychosis.  She admits to having been using "medication" from an herbal group which she was going to and admits she doesn't really know what she was taking.  She was open to starting antidepressant, and Zoloft was started over the weekend.  She denies any side effects.  Agreeable to continuing to increase.  Education provided.  The patient has been visible on the unit, social with select peers, and attending groups.  The patient reports eating and sleeping well.  Reports a prior admit at age 19 and was told she had bipolar but seems to think this means hourly mood changes and also notes regular premenstrual mood changes and we discussed this again today.  Came to team meeting in AM to meet full team and case reviewed.  Feels admit is major stressor with no insurance, worries about this.  Met with MD in PM and we accomplished hypo/lenard checklist, and pt showed significant score of 15/32.  We reviewed mood stabilizer role again. She has been compliant with medications, tolerating them well.  No new SEs reported or observed.

## 2023-06-21 NOTE — BH PSYCHOLOGY - GROUP THERAPY NOTE - NSBHPSYCHOLPARTICIPCOMMENT_PSY_A_CORE FT
Patient was partially engaged during the session. Patient was laying on the grass carpet during the group session and responded to questions when prompted.

## 2023-06-21 NOTE — BH PSYCHOLOGY - GROUP THERAPY NOTE - TOKEN PULL-DIAGNOSIS
Primary Diagnosis:  Major depression [F32.9]        Problem Dx:   Premenstrual symptom [N94.3]
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
## 2023-06-21 NOTE — BH TREATMENT PLAN - NSTXCOPEINTERPR_PSY_ALL_CORE
Psychiatric Rehabilitation recommends that patient identify 2 effective coping methods to better meet her needs for improved symptom management and insight by day 7. Department staff will provide supportive counseling/motivational interviewing and daily group therapy to assist patient in achieving therapeutic goals.

## 2023-06-21 NOTE — BH INPATIENT PSYCHIATRY PROGRESS NOTE - NSBHASSESSSUMMFT_PSY_ALL_CORE
37 year old single female (recently broke up with boyfriend).  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  Patient has PPH of Mood Disorder, and Anxiety Disorder.   Patient has 1 prior inpatient hospitalization 18 years ago.  Patient is now hospitalized with a primary problem of worsening depression and anxiety, having SI with formulated plan to jump in front of train in context of recent break up with boyfriend.  Patient admitted to [facility]  on a 9.13 legal status. This patient requires inpatient hospitalization due to symptoms of mental illness so severe that they significantly interfere with activities of daily living, and presents a potential danger to self as a result of acute decompensation with active SI, thoughts to self harm with formulated plan. She is requiring 24-hour care at this time as a result, for psychiatric stabilization and safety.    The patient continues to be depressed, tearful at times, but is open to medications.  She was encouraged to attend groups.  She later submitted a 3-day letter for discharge.  Tolerating Zoloft well, wull increase to 50mg daily.    Plan:  1.	Legals: admit on 9.13  2.	Safety: routine observation, denies SI/HI/I/P on the unit. PRNs: Ativan/Haldol/Benadryl PO/IM  3.	Psychiatry: increase Zoloft to 50 mg daily-- stopped for trial of mood stabilizer:  start abilify 5 mg PO qhs and attempt 10 mg qhs THURS  can consider WALTER for safety and compliance if needed  4.	Group, milieu, individual therapy as appropriate.  5.	Medical: no significant PMH, monitor for withdrawal symptoms from MDA.  Admission labs WNL.  6.	Dispo: pending clinical improvement.  Patient continues to require inpatient hospitalization for stabilization and safety. AOPD aftercare vs other

## 2023-06-21 NOTE — BH TREATMENT PLAN - NSTXDCOPLKINTERSW_PSY_ALL_CORE
Support and psychoed to be provided and all elements of the discharge plan to be arranged when appropriate.

## 2023-06-21 NOTE — BH PSYCHOLOGY - CLINICIAN PSYCHOTHERAPY NOTE - TOKEN PULL-DIAGNOSIS
Primary Diagnosis:  Major depression [F32.9]        Problem Dx:   Premenstrual symptom [N94.3]      
Primary Diagnosis:  Major depression [F32.9]        Problem Dx:   Premenstrual symptom [N94.3]

## 2023-06-21 NOTE — BH PSYCHOLOGY - GROUP THERAPY NOTE - NSPSYCHOLGRPCOGPT_PSY_A_CORE FT
atient attended recovery oriented/acceptance & commitment therapy group. Group started with check-in prompt (How are you feeling?). Patients shared that they were feeling grateful for the nice weather and the good care at [facility] as well as sad for missing important family events while at [facility]. Patients then shared their reactions to this writer completing her training at [facility] and this being her last group session. Members offered their well wishes/thanks and reflected on their own life transitions. Group then focused on Passengers on a Bus metaphor. Members reflected on their values, including spirituality, connection with family and friends, self-love, independence/self-sufficiency, peace, mental health, and physical health as well as how thoughts/emotions/memories can interfere with engaging in behaviors that are aligned with values. Members reviewed how a  can continue to mindfully move, while bringing rowdy and negative passengers along on the ride. Members considered what their own rowdy passengers might be saying while they are on their journeys.  facilitated discussion of concepts, encouraged active participation, and supported members providing feedback to peers.

## 2023-06-21 NOTE — BH INPATIENT PSYCHIATRY PROGRESS NOTE - MSE UNSTRUCTURED FT
The patient appears stated age, with good hygiene, and is dressed appropriately.  She was calm and cooperative with the interview.  She maintained appropriate eye contact.  No restlessness or slowing of movements observed.  Gait is steady.  The patient’s speech was fluent, normal in tone, rate, and volume.  The patient’s mood is “depressed and my family is too dependent on me... I am the helper.”  Her affect is constricted, tearful at times, stable, mostly appropriate with all attenuating.  The patient’s thoughts are goal directed.  She does not have any delusions or hallucinations.  She does not have any current suicidal or homicidal ideation, intent, or plan and is becoming more hopeful.  Insight is fair.  Judgment is fair.  Impulse control has been good on the unit. Reliability; fair  VSS

## 2023-06-21 NOTE — BH TREATMENT PLAN - NSCMSPTSTRENGTHS_PSY_ALL_CORE
Able to adapt/Assertive/Compliance to treatment/Courageous/Expressive of emotions/Rita/spirituality/Financial stability/Flexibility/Future/goal oriented/Highly motivated for treatment/Intact employment/Intact family/Intelligence/Interpersonal skills/Leisure interest/Motivated/Physically healthy/Positive attitude/Resourceful/Self confidence/Self-reliant/Sense of Humor/Strong support system/Supportive family

## 2023-06-21 NOTE — BH INPATIENT PSYCHIATRY PROGRESS NOTE - NSBHCHARTREVIEWVS_PSY_A_CORE FT
Vital Signs Last 24 Hrs  T(C): 36.2 (06-21-23 @ 07:30), Max: 36.2 (06-21-23 @ 07:30)  T(F): 97.2 (06-21-23 @ 07:30), Max: 97.2 (06-21-23 @ 07:30)  HR: --  BP: --  BP(mean): --  RR: --  SpO2: --    Orthostatic VS  06-21-23 @ 20:03  Lying BP: --/-- HR: --  Sitting BP: 118/70 HR: 75  Standing BP: 100/77 HR: 90  Site: --  Mode: --  Orthostatic VS  06-21-23 @ 07:30  Lying BP: --/-- HR: --  Sitting BP: 119/76 HR: 82  Standing BP: --/-- HR: --  Site: --  Mode: --  Orthostatic VS  06-20-23 @ 07:41  Lying BP: --/-- HR: --  Sitting BP: 99/62 HR: 78  Standing BP: --/-- HR: --  Site: --  Mode: --

## 2023-06-22 RX ORDER — ARIPIPRAZOLE 15 MG/1
10 TABLET ORAL AT BEDTIME
Refills: 0 | Status: DISCONTINUED | OUTPATIENT
Start: 2023-06-22 | End: 2023-06-23

## 2023-06-22 RX ADMIN — Medication 50 MILLIGRAM(S): at 16:05

## 2023-06-22 RX ADMIN — ARIPIPRAZOLE 10 MILLIGRAM(S): 15 TABLET ORAL at 21:23

## 2023-06-22 RX ADMIN — Medication 1 MILLIGRAM(S): at 13:01

## 2023-06-22 RX ADMIN — Medication 1 MILLIGRAM(S): at 09:00

## 2023-06-22 RX ADMIN — HALOPERIDOL DECANOATE 5 MILLIGRAM(S): 100 INJECTION INTRAMUSCULAR at 16:05

## 2023-06-22 NOTE — BH INPATIENT PSYCHIATRY PROGRESS NOTE - NSBHCHARTREVIEWVS_PSY_A_CORE FT
Vital Signs Last 24 Hrs  T(C): 36.5 (06-23-23 @ 07:49), Max: 36.5 (06-23-23 @ 07:49)  T(F): 97.7 (06-23-23 @ 07:49), Max: 97.7 (06-23-23 @ 07:49)  HR: 72 (06-23-23 @ 07:49) (72 - 72)  BP: 111/65 (06-23-23 @ 07:49) (111/65 - 111/65)  BP(mean): --  RR: --  SpO2: --    Orthostatic VS  06-22-23 @ 20:31  Lying BP: --/-- HR: --  Sitting BP: 92/56 HR: 80  Standing BP: 98/62 HR: 118  Site: upper left arm  Mode: --  Orthostatic VS  06-22-23 @ 08:04  Lying BP: --/-- HR: --  Sitting BP: 106/70 HR: 85  Standing BP: 100/60 HR: 101  Site: --  Mode: --

## 2023-06-22 NOTE — BH DISCHARGE NOTE NURSING/SOCIAL WORK/PSYCH REHAB - DISCHARGE INSTRUCTIONS AFTERCARE APPOINTMENTS
In order to check the location, date, or time of your aftercare appointment, please refer to your Discharge Instructions Document given to you upon leaving the hospital.  If you have lost the instructions please call [phone]

## 2023-06-22 NOTE — BH DISCHARGE NOTE NURSING/SOCIAL WORK/PSYCH REHAB - NSBHDCADDR1FT_A_CORE
[id number] [place] [id number], Brunilda steel [address]nd [address]  [facility] bell

## 2023-06-22 NOTE — BH DISCHARGE NOTE NURSING/SOCIAL WORK/PSYCH REHAB - NSDCPRGOAL_PSY_ALL_CORE
Patient has attended approximately 40% of daily psychiatric groups and she was able to identify boundary setting and self-compassion as effective coping methods to better meet her needs in the community. Patient reported improved mood and optimism as well as increased self-analysis since admission, and she endorsed intent to maintain medication/treatment compliance in the community. Patient reported intent to tighten up her daily self-care regiment as a short-term goal and she disclosed uncertainty regarding long term goals at this time. Patient reported an adequate social support system in the community, identified routine and increased pleasurable activities as needs for improvement, and disclosed intent to use improved insight/judgment to avoid readmission and keep herself goal focused.  Patient denied suicidal ideation, homicidal ideation, psychotic symptoms, mood symptoms and she completed patient safety plan.

## 2023-06-22 NOTE — BH DISCHARGE NOTE NURSING/SOCIAL WORK/PSYCH REHAB - NSCDUDCCRISIS_PSY_A_CORE
[facility]  1 (718) [facility] (386-2040)  Text "WELL" to 18364  Website: www.[employer]/.National Suicide Prevention Lifeline 7 (540) 456-1271/.  [facility]  1 (879) LIFENET (349-0988)/.  [facility]  (259) 728-8429/.  [facility] Helpline / [facility]  (764) 011-OLFA (3731)/.  [facility]  [address] [place] [address] [phone]   Hours:  Monday through Friday from 9 AM to 3 PM/988 Suicide and Crisis Lifeline

## 2023-06-22 NOTE — BH SAFETY PLAN - ENVIRONMENT SAFETY 1:
I can designate a space in my home to engage in my chosen coping methods including meditation and/or painting.

## 2023-06-22 NOTE — BH DISCHARGE NOTE NURSING/SOCIAL WORK/PSYCH REHAB - MODE OF TRANSPORTATION
Pt requested assistance obtaining transportation home. Writer arranged cab using [employer]./Taxi Pt requested assistance obtaining transportation home. Writer arranged cab using [employer] transportation. #[id number] (no insurance on file cannot call medicaid cab)/Taxi

## 2023-06-22 NOTE — BH INPATIENT PSYCHIATRY PROGRESS NOTE - NSBHASSESSSUMMFT_PSY_ALL_CORE
37 year old single female (recently broke up with boyfriend).  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  Patient has PPH of Mood Disorder, and Anxiety Disorder.   Patient has 1 prior inpatient hospitalization 18 years ago.  Patient is now hospitalized with a primary problem of worsening depression and anxiety, having SI with formulated plan to jump in front of train in context of recent break up with boyfriend.  Patient admitted to [facility]  on a 9.13 legal status. This patient requires inpatient hospitalization due to symptoms of mental illness so severe that they significantly interfere with activities of daily living, and presents a potential danger to self as a result of acute decompensation with active SI, thoughts to self harm with formulated plan. She is requiring 24-hour care at this time as a result, for psychiatric stabilization and safety. The patient continues to be depressed, tearful at times, but is open to medications.  She was encouraged to attend groups.  She later submitted a 3-day letter for discharge.  Tolerating Zoloft well, but we opted for mood stabilizer given lability, trial of abilify with goal of FRI DC.    Plan:  1.	Legals: admit on 9.13  2.	Safety: routine observation, denies SI/HI/I/P on the unit. PRNs: Ativan/Haldol/Benadryl PO/IM  3.	Psychiatry: increase Zoloft to 50 mg daily-- stopped for trial of mood stabilizer:  start abilify 5 mg PO qhs and attempt 10 mg qhs THURS  4.	Group, milieu, individual therapy as appropriate.  5.	Medical: no significant PMH, monitor for withdrawal symptoms from MDA.  Admission labs WNL.  6.	Dispo: pending clinical improvement.  Patient continues to require inpatient hospitalization for stabilization and safety. AOPD aftercare vs other

## 2023-06-22 NOTE — BH INPATIENT PSYCHIATRY PROGRESS NOTE - NSBHFUPINTERVALHXFT_PSY_A_CORE
Patient seen for follow up of depression, SI, chart reviewed, and case discussed with treatment team.  No events reported overnight. ED note duly noted and appreciated. Patient states her thoughts were mostly passive, now show improvement, denies any active SI.  No history of attempts.  The patient denies any lenard or psychosis. She admits to having been using "medication" from an herbal group which she was going to and admits she doesn't really know what she was taking but this did include [place] and pt was open to starting antidepressant, and Zoloft was started over the weekend.  She denies any side effects.  The patient has been visible on the unit, social with select peers, and attending groups.  The patient reports eating and sleeping well.  Reports a prior admit at age 19 and was told she had bipolar but seems to think this means hourly mood changes and also notes regular premenstrual mood changes and we discussed this again today. We cross tapered zoloft to abilify for better mood stabilizing effects.  Feels admit is major stressor with no insurance, worries about this and we made plan for FRI DC with abilfy increase tonight.  Met with MD and we accomplished hypo/lenard checklist, and pt showed significant score of 15/32.  We reviewed mood stabilizer role again. She has been compliant with medications, tolerating them well.  Feels she might want to add zoloft and we will discuss further in AM.  Given depression and anxiety instruments to complete.  No new SEs reported or observed.

## 2023-06-22 NOTE — BH DISCHARGE NOTE NURSING/SOCIAL WORK/PSYCH REHAB - PATIENT PORTAL LINK FT
You can access the FollowMyHealth Patient Portal offered by [facility] by registering at the following website: [email or web address]. By joining [facility]’s FollowMyHealth portal, you will also be able to view your health information using other applications (apps) compatible with our system.

## 2023-06-22 NOTE — BH DISCHARGE NOTE NURSING/SOCIAL WORK/PSYCH REHAB - NSDCPRRECOMMEND_PSY_ALL_CORE
Psychiatric rehabilitation recommends that patient maintain medication/treatment compliance in the unit and that she expands her knowledge/practice of personally relevant coping methods to better meet her needs. Upon discharge, patient would benefit from ongoing medication management, psychotherapy, and support. Patient may also benefit from participation in a caregiver support group.

## 2023-06-22 NOTE — BH INPATIENT PSYCHIATRY PROGRESS NOTE - MSE UNSTRUCTURED FT
The patient appears stated age, with good hygiene, and is dressed appropriately with good rapport with her MD developing.  She was calm and cooperative with the interview.  She maintained appropriate eye contact.  No restlessness or slowing of movements observed.  Gait is steady.  The patient’s speech was fluent, normal in tone, rate, and volume.  The patient’s mood is “less depressed and my family is too dependent on me... I am the helper.”  Her affect is constricted, tearful at times, less labile and mostly appropriate with all attenuating.  The patient’s thoughts are goal directed.  She does not have any delusions or hallucinations.  She does not have any current suicidal or homicidal ideation, intent, or plan and is becoming more hopeful.  Insight is fair.  Judgment is fair.  Impulse control has been good on the unit. Reliability; fair  VSS

## 2023-06-23 VITALS — DIASTOLIC BLOOD PRESSURE: 65 MMHG | SYSTOLIC BLOOD PRESSURE: 111 MMHG | TEMPERATURE: 98 F | HEART RATE: 72 BPM

## 2023-06-23 PROCEDURE — 99239 HOSP IP/OBS DSCHRG MGMT >30: CPT

## 2023-06-23 RX ORDER — ARIPIPRAZOLE 15 MG/1
1 TABLET ORAL
Qty: 30 | Refills: 0
Start: 2023-06-23 | End: 2023-07-22

## 2023-06-23 RX ORDER — SERTRALINE 25 MG/1
1 TABLET, FILM COATED ORAL
Qty: 30 | Refills: 0
Start: 2023-06-23 | End: 2023-07-22

## 2023-06-23 RX ORDER — CITALOPRAM 10 MG/1
0 TABLET, FILM COATED ORAL
Qty: 0 | Refills: 0 | DISCHARGE

## 2023-06-23 RX ADMIN — Medication 1 MILLIGRAM(S): at 08:45

## 2023-06-23 NOTE — BH INPATIENT PSYCHIATRY PROGRESS NOTE - NSBHFUPINTERVALHXFT_PSY_A_CORE
Patient seen for follow up of depression, SI, chart reviewed, and case discussed with treatment team.  No events reported overnight.   Given depression and anxiety instruments to complete which show significant anxiety and mood improved.  Feels calmer but thinks she still wants to continue zoloft.  Wishes appt as o/p with her MD next week.  Denies all SHIIP.  Stable for DC to home.  No evident psychosis, lenard or depression, more hopeful and wants to attend a bridal shower today.  No new SEs reported or observed.

## 2023-06-23 NOTE — BH INPATIENT PSYCHIATRY DISCHARGE NOTE - HOSPITAL COURSE
To be updated by Dr. Cedillo: [email or web address]    Pt on WALTER medication? N     Updated by Dr. Cedillo: [email or web address]    Pt on WALTER medication? N    BD: 9/22/[birth year]  CLINICAL COURSE  VOL Admit dates on [facility]: 6/18/23 to 6/23/23  Pt arrived to the [facility] unit in the above context. Mynor arrived to the unit depressed and anxious and endorsing hopelessness about her situation regarding family relationships and recently ended relationship and feeling overwhelmed in the helper role likely unable to meet her emotional needs. She actively works as an OR RN. She endorsed passive SI but no tiago plan to harm herself unlike in the ED note. For this reason Zoloft was stopped at 50 mg daily and patient was recommended trial of Abilify as a mood stabilizer. Patient was thoughtful and had experience in therapy and felt that her work as an OR nurse was her safe space while at home life living with parents who caused her much stress with constant needs and demands. She felt her voice was lost and worked to restore identifying her stressors and how better to cope with her situation. She felt a mood stabilizer did not show dramatic affect initially so it was increased to 10 mg nightly. Patient place 72h voluntary letter and did wish discharge soon after arrival and team opted for a day delay to help patient set up aftercare, but did not see active SIIP that called for Retention. She did consider restart of Zoloft along with Abilify but was overly focused on bridal event for friend for whom she was ready to attend a bridal shower by the end of the day Friday which was a major, and perhaps overly a priority for her. Aftercare for psychopharm was arranged with writer and she wished to continue with her current therapist for the time being and consider therapy with writer. Patient engaged groups and Milieu therapy and daily supportive therapy sessions and also met with clinical psychologist to good effect. She felt ready for the challenge to return home notably as family was staying in [place] for the summer and this would give her time to focus on her own issues. Patient denied all wish for self harm and showed no psychosis/delusions/AVTH nor any lenard nor depression with mood much improved and anxiety attenuated.  Discouraged all further Adderall use.  Patient was deemed psychiatrically stable for discharge with notably attenuated symptoms and no wish for self harm or harm of others and adequate behavioral control and no AVTH and no lenard or depressed mood much improved and anxiety attenuated. She thanked her treatment team and had developed a good rapport with her MD and wished to f/u with him next week in [facility].  Low acute risk of harm to self or suicide. The patient is at elevated chronic risk of self-harm due to an underlying mood disorder. Other risk factors include substance use, at times poor frustration tolerance and lack of sustained aftercare. Protective factors for suicide include improved insight, treatment engagement, medication adherence, lack of access to firearms, supportive social network, future-oriented, patient denies current SIIP and SIIP throughout hospitalization. Risk factors mitigated during this hospitalization include depressed mood and anxiety and poor frustration tolerance.    Low acute risk of violence or aggression. Risk factors include substance use, and poor frustration tolerance. Protective factors include denies current homicidal, aggressive, or violent ideation, intent, or plan, patient was in good behavioral control with some improvements in insight. Risk factors mitigated during this hospitalization include depressed mood and anxiety and poor frustration tolerance.  No acute MED issues during stay.  Please contact Dr. Cedillo/Rosaline Attending and Unit Chief, if any questions:  [phone] or [phone] or [email or web address]  See DISCHARGE PLAN and Rx meds at discharge, below.    MSE  See final progress note for MSE at discharge.    DSM5 DD  BAD, current episode depressed w/o catatonia  r/o MDD, current episode depressed w/o catatonia  Anxiety disorder NOS  R/o BPD/borderline traits    DISCHARGE PLAN  1)	Pt stable for DC to home with Rx meds via [facility];  2)	Psychopharm, as below:    Home Medications at time of Discharge Reconciliation: 23-Jun-2023 09:33    ARIPiprazole 10 mg oral tablet 1 tab(s) orally once a day (at bedtime) ; Active    Zoloft 50 mg oral tablet 1 tab(s) orally once a day (at bedtime) Can be restarted as nightly for mood/anxiety if desired. Dr. ALIRIO RAMIREZ ; Active     3)	Long Acting Injectible medication (WALTER): None.    4)	[facility] f/u care with writer    5)	Medical issues needing f/u: none

## 2023-06-23 NOTE — BH INPATIENT PSYCHIATRY PROGRESS NOTE - NSBHCONSBHPROVDETAILS_PSY_A_CORE  FT
defer to primary team

## 2023-06-23 NOTE — BH INPATIENT PSYCHIATRY PROGRESS NOTE - PRN MEDS
MEDICATIONS  (PRN):  
MEDICATIONS  (PRN):  diphenhydrAMINE 50 milliGRAM(s) Oral every 6 hours PRN Agitation  diphenhydrAMINE Injectable 50 milliGRAM(s) IntraMuscular once PRN Agitation  haloperidol     Tablet 5 milliGRAM(s) Oral every 6 hours PRN Agitation  haloperidol    Injectable 5 milliGRAM(s) IntraMuscular once PRN Agitation  LORazepam     Tablet 2 milliGRAM(s) Oral every 6 hours PRN Anxiety/Agitation  LORazepam   Injectable 2 milliGRAM(s) IntraMuscular once PRN Agitation  nicotine  Polacrilex Gum 2 milliGRAM(s) Oral every 2 hours PRN Nicotine Dependence  
MEDICATIONS  (PRN):  diphenhydrAMINE 50 milliGRAM(s) Oral every 6 hours PRN Agitation  diphenhydrAMINE Injectable 50 milliGRAM(s) IntraMuscular once PRN Agitation  haloperidol     Tablet 5 milliGRAM(s) Oral every 6 hours PRN Agitation  haloperidol    Injectable 5 milliGRAM(s) IntraMuscular once PRN Agitation  LORazepam     Tablet 2 milliGRAM(s) Oral every 6 hours PRN Anxiety/Agitation  LORazepam   Injectable 2 milliGRAM(s) IntraMuscular once PRN Agitation  nicotine  Polacrilex Gum 2 milliGRAM(s) Oral every 2 hours PRN Nicotine Dependence  
MEDICATIONS  (PRN):  diphenhydrAMINE 50 milliGRAM(s) Oral every 6 hours PRN Agitation  diphenhydrAMINE Injectable 50 milliGRAM(s) IntraMuscular once PRN Agitation  haloperidol     Tablet 5 milliGRAM(s) Oral every 6 hours PRN Agitation  haloperidol    Injectable 5 milliGRAM(s) IntraMuscular once PRN Agitation  LORazepam     Tablet 1 milliGRAM(s) Oral every 4 hours PRN Anxiety/Agitation  LORazepam   Injectable 2 milliGRAM(s) IntraMuscular once PRN Agitation  nicotine  Polacrilex Gum 2 milliGRAM(s) Oral every 2 hours PRN Nicotine Dependence  
MEDICATIONS  (PRN):

## 2023-06-23 NOTE — BH INPATIENT PSYCHIATRY DISCHARGE NOTE - OTHER PAST PSYCHIATRIC HISTORY (INCLUDE DETAILS REGARDING ONSET, COURSE OF ILLNESS, INPATIENT/OUTPATIENT TREATMENT)
Patient is a 37 year old female who lives with her parents, father, Curtis Rader, [phone]. She is a nurse. 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 She reports that she has had low energy and mood with hopelessness, increased appetite with weight gain, increased tearfulness and irritability, and thoughts to jump in front of a train. She was tearful in the ED. She had an episode of aggression towards her boyfriend several weeks ago and he subsequently broke up with her, stating he did not feel safe around her. She became increasingly tearful, despondent, hopeless, and, ultimately, suicidal. She believes now that the plant medication is not right for her. She has no reported history of SIB, substance abuse, legal issues, or aggression prior to this. The patient feels overwhelmed by her responsibilities including helping her parents. She remains tearful and hopeless. The patient will need a referral for appropriate outpatient treatment. There is no insurance information available to this writer at this time.

## 2023-06-23 NOTE — BH INPATIENT PSYCHIATRY PROGRESS NOTE - NSCGISEVERILLNESS_PSY_ALL_CORE
6 = Severely ill - disruptive pathology, behavior and function are frequently influenced by symptoms, may require assistance from others

## 2023-06-23 NOTE — BH INPATIENT PSYCHIATRY DISCHARGE NOTE - NSBHDCHANDOFFFT_PSY_ALL_CORE
Writer is next provider   Appt: Pt not on WALTER. Follow up care is via [facility] with writer.  Phone: [phone]

## 2023-06-23 NOTE — BH INPATIENT PSYCHIATRY PROGRESS NOTE - NSBHASSESSSUMMFT_PSY_ALL_CORE
37 year old single female (recently broke up with boyfriend).  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  Patient has PPH of Mood Disorder, and Anxiety Disorder.   Patient has 1 prior inpatient hospitalization 18 years ago.  Patient is now hospitalized with a primary problem of worsening depression and anxiety, having SI with formulated plan to jump in front of train in context of recent break up with boyfriend.  Patient admitted to [facility]  on a 9.13 legal status. This patient requires inpatient hospitalization due to symptoms of mental illness so severe that they significantly interfere with activities of daily living, and presents a potential danger to self as a result of acute decompensation with active SI, thoughts to self harm with formulated plan. She is requiring 24-hour care at this time as a result, for psychiatric stabilization and safety. The patient continues to be depressed, tearful at times, but is open to medications.  She was encouraged to attend groups.  She later submitted a 3-day letter for discharge.  Tolerating Zoloft well, but we opted for mood stabilizer given lability, trial of abilify with goal of FRI DC.    Plan:  1.	Legals: admit on 9.13  2.	Safety: routine observation, denies SI/HI/I/P on the unit. PRNs: Ativan/Haldol/Benadryl PO/IM  3.	Psychiatry: increase Zoloft to 50 mg daily-- stopped for trial of mood stabilizer:  start abilify 5 mg PO qhs and attempt 10 mg qhs THURS  can continue zoloft 50 mg qhs at discharge if desired  4.	Group, milieu, individual therapy as appropriate.  5.	Medical: no significant PMH, monitor for withdrawal symptoms from MDA.  Admission labs WNL.  6.	Dispo: pending clinical improvement.  Patient continues to require inpatient hospitalization for stabilization and safety. AOPD aftercare vs other

## 2023-06-23 NOTE — BH INPATIENT PSYCHIATRY PROGRESS NOTE - NSTXNEGATGOAL_PSY_ALL_CORE
Will be able to identify and utilize affirmations to create positive self-talk

## 2023-06-23 NOTE — BH INPATIENT PSYCHIATRY PROGRESS NOTE - NSTXSUPORTGOAL_PSY_ALL_CORE
Patient will explore available community resources to build support network

## 2023-06-23 NOTE — BH INPATIENT PSYCHIATRY DISCHARGE NOTE - ATTENDING DISCHARGE PHYSICAL EXAMINATION:
Stable for DC to home  Denies all SHIIP  Rx meds filled  Brighter affect with improved mood  No evident psychosis  HELENA and BDA assessments done

## 2023-06-23 NOTE — BH INPATIENT PSYCHIATRY PROGRESS NOTE - NSBHTIMEACTIVITIESPERFORMED_PSY_A_CORE
Case review in AM by full team and pt joined team meeting  psychopharm and hypo/lenard checklist given and discussed  supportive therapy
Case review in AM by full team and pt joined team meeting  psychopharm and HELENA and BDA checklists given and discussed  supportive therapy  Dispo planning, writer will assume care after DC
Case review in AM by full team  Psychopharm  Supportive therapy
Tx meds  ASHLEY  Psychopharm and supportive therapy  Dispo planning, writer will assume care after DC  DC summary

## 2023-06-23 NOTE — BH INPATIENT PSYCHIATRY PROGRESS NOTE - CURRENT MEDICATION
MEDICATIONS  (STANDING):  ARIPiprazole 5 milliGRAM(s) Oral at bedtime    MEDICATIONS  (PRN):  diphenhydrAMINE 50 milliGRAM(s) Oral every 6 hours PRN Agitation  diphenhydrAMINE Injectable 50 milliGRAM(s) IntraMuscular once PRN Agitation  haloperidol     Tablet 5 milliGRAM(s) Oral every 6 hours PRN Agitation  haloperidol    Injectable 5 milliGRAM(s) IntraMuscular once PRN Agitation  LORazepam     Tablet 1 milliGRAM(s) Oral every 4 hours PRN Anxiety/Agitation  LORazepam   Injectable 2 milliGRAM(s) IntraMuscular once PRN Agitation  nicotine  Polacrilex Gum 2 milliGRAM(s) Oral every 2 hours PRN Nicotine Dependence  
MEDICATIONS  (STANDING):    MEDICATIONS  (PRN):  diphenhydrAMINE 50 milliGRAM(s) Oral every 6 hours PRN Agitation  diphenhydrAMINE Injectable 50 milliGRAM(s) IntraMuscular once PRN Agitation  haloperidol     Tablet 5 milliGRAM(s) Oral every 6 hours PRN Agitation  haloperidol    Injectable 5 milliGRAM(s) IntraMuscular once PRN Agitation  LORazepam     Tablet 2 milliGRAM(s) Oral every 6 hours PRN Anxiety/Agitation  LORazepam   Injectable 2 milliGRAM(s) IntraMuscular once PRN Agitation  nicotine  Polacrilex Gum 2 milliGRAM(s) Oral every 2 hours PRN Nicotine Dependence  
MEDICATIONS  (STANDING):    MEDICATIONS  (PRN):  
MEDICATIONS  (STANDING):  ARIPiprazole 5 milliGRAM(s) Oral at bedtime    MEDICATIONS  (PRN):  diphenhydrAMINE 50 milliGRAM(s) Oral every 6 hours PRN Agitation  diphenhydrAMINE Injectable 50 milliGRAM(s) IntraMuscular once PRN Agitation  haloperidol     Tablet 5 milliGRAM(s) Oral every 6 hours PRN Agitation  haloperidol    Injectable 5 milliGRAM(s) IntraMuscular once PRN Agitation  LORazepam     Tablet 2 milliGRAM(s) Oral every 6 hours PRN Anxiety/Agitation  LORazepam   Injectable 2 milliGRAM(s) IntraMuscular once PRN Agitation  nicotine  Polacrilex Gum 2 milliGRAM(s) Oral every 2 hours PRN Nicotine Dependence  
MEDICATIONS  (STANDING):    MEDICATIONS  (PRN):

## 2023-06-23 NOTE — BH INPATIENT PSYCHIATRY PROGRESS NOTE - NSTXANXGOAL_PSY_ALL_CORE
Identify and practice 3 coping skills to manage anxiety
Be able to perform ADLs and maintain safety despite anxiety/panic daily
Identify and practice 3 coping skills to manage anxiety
Identify and practice 3 coping skills to manage anxiety
Be able to perform ADLs and maintain safety despite anxiety/panic daily

## 2023-06-23 NOTE — BH INPATIENT PSYCHIATRY PROGRESS NOTE - NSDCCRITERIA_PSY_ALL_CORE
Symptom Stabilization  CGI<=3  Outpatient services f/u  
Symptom Stabilization  CGI<=3  Outpatient services f/u  
Symptom Stabilization  CGI<=2  Outpatient services f/u  
Symptom Stabilization  CGI<=3  Outpatient services f/u  
Symptom Stabilization  CGI<=2  Outpatient services f/u

## 2023-06-23 NOTE — BH INPATIENT PSYCHIATRY DISCHARGE NOTE - NSBHMETABOLIC_PSY_ALL_CORE_FT
BMI: BMI (kg/m2): 25 (06-18-23 @ 01:29)  HbA1c: A1C with Estimated Average Glucose Result: 4.6 % (06-19-23 @ 08:22)    Glucose:   BP: 111/65 (06-23-23 @ 07:49) (111/65 - 111/65)  Lipid Panel: Date/Time: 06-19-23 @ 08:22  Cholesterol, Serum: 173  Direct LDL: --  HDL Cholesterol, Serum: 62  Total Cholesterol/HDL Ration Measurement: --  Triglycerides, Serum: 47

## 2023-06-23 NOTE — BH INPATIENT PSYCHIATRY PROGRESS NOTE - NSTXDEPRESGOAL_PSY_ALL_CORE
Will identify 2 coping skills that assist in improving mood
Exhibit improvements in self-grooming, hygiene, sleep and appetite
Exhibit improvements in self-grooming, hygiene, sleep and appetite

## 2023-06-23 NOTE — BH INPATIENT PSYCHIATRY PROGRESS NOTE - NSBHMETABOLIC_PSY_ALL_CORE_FT
BMI: BMI (kg/m2): 25 (06-18-23 @ 01:29)  HbA1c: A1C with Estimated Average Glucose Result: 4.6 % (06-19-23 @ 08:22)    Glucose:   BP: --  Lipid Panel: Date/Time: 06-19-23 @ 08:22  Cholesterol, Serum: 173  Direct LDL: --  HDL Cholesterol, Serum: 62  Total Cholesterol/HDL Ration Measurement: --  Triglycerides, Serum: 47  
BMI: BMI (kg/m2): 25 (06-18-23 @ 01:29)  HbA1c: A1C with Estimated Average Glucose Result: 4.6 % (06-19-23 @ 08:22)    Glucose:   BP: 111/65 (06-23-23 @ 07:49) (111/65 - 111/65)  Lipid Panel: Date/Time: 06-19-23 @ 08:22  Cholesterol, Serum: 173  Direct LDL: --  HDL Cholesterol, Serum: 62  Total Cholesterol/HDL Ration Measurement: --  Triglycerides, Serum: 47  
BMI: BMI (kg/m2): 25 (06-18-23 @ 01:29)  HbA1c: A1C with Estimated Average Glucose Result: 4.6 % (06-19-23 @ 08:22)    Glucose:   BP: 111/65 (06-23-23 @ 07:49) (111/65 - 111/65)  Lipid Panel: Date/Time: 06-19-23 @ 08:22  Cholesterol, Serum: 173  Direct LDL: --  HDL Cholesterol, Serum: 62  Total Cholesterol/HDL Ration Measurement: --  Triglycerides, Serum: 47

## 2023-06-23 NOTE — BH INPATIENT PSYCHIATRY DISCHARGE NOTE - HPI (INCLUDE ILLNESS QUALITY, SEVERITY, DURATION, TIMING, CONTEXT, MODIFYING FACTORS, ASSOCIATED SIGNS AND SYMPTOMS)
Chart review and Patient was seen and evaluated, chart, medications and labs reviewed. Case discussed with nursing team.  On service for this 37 year old single female (recently broke up with boyfriend).  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  Patient has PPH of Mood Disorder, and Anxiety Disorder.   Patient has 1 prior inpatient hospitalization 18 years ago.  Patient is now hospitalized with a primary problem of worsening depression and anxiety, having SI with formulated plan to jump in front of train in context of recent break up with boyfriend.  Patient admitted to [facility]  on a 9.13 legal status. I have reviewed the initial psychiatric assessment in the electronic medical record, including the history of present illness, past psychiatric history, family/social history (no pertinent changes), and exam, and have confirmed the salient findings dated  23.  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 Telepsychiatry consulted for psych eval.  Upon approach patient appears tearful and distressed. She reports "I don't understand why I am living, I'm waking up with panic attacks everyday, with impending doom." She states "why am I even alive?" Patient reports that she has been feeling this way for several months. Reporting that she is an emotional wreck. She reports feeling hopeless. 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 Patient reports that she has tried meditation and prayer to cope but cant take it anymore. Patient has living alone, while her parents are out of town. She reports that she is having trouble in her two year relationship. She feeling burnt out. Patient reports that she has been having anxiety attacks about her existence. She reports that she is having thoughts of wanting to jump in front of a train, thought about flying out to another country to get euthanasia. She reports that she has been avoiding medicine but it is too late, "these thoughts are not good, I'm not feeling safe with myself." She reports that her therapist recommended that she gets a psych eval. Patient reports that she tried to get help through Plant Medicine using an anxiety healing modality MDA and [place]. Patient has been chain smoking for the past two days. Patient has been sleeping ok. She reports gaining 20 pounds in 1.5 months.    On unit: information came from chart review and patient interview  Patient presents with depressed mood, anxiety and reports lacks support.  Patient presents tearful, sad.  Patient reports past history of depression for several years old.  Has 1 prior psychiatric admission 18 years ago. Denies past SA/SIB/HI.   More recently depressive symptoms in November,  however has worsened over the past recent weeks triggered by recent break up with boyfriend, responsibilities with caring for her parents.   In today’s interview patient reported that her mood is very sad, overwhelmed “I’ve been crying all day” Pt reports feeling “burnt out, I’ve been having  emotional outbursts ”  Patient reports depressive symptoms including: anhedonia, increased appetite (gained  20lbs in the past 2 months), feels hopeless/emptiness, increase crying episodes, fatigue, she lacks energy, reports small tasks take extra effort,  insomnia, at times poor concentration, excessive worrying, PMR, slowed thinking and movements.  Reports can be irritable, poor frustration tolerance, PMA. Reports recurrent fleeting thoughts of death, (suicidal thoughts with formulated plan to jump in front of train).    Patient reports her depressive symptoms had interfered with her thinking and functionality: not taking care of herself, decreased self -care and ADL, not eating regularly. Patient reported her symptoms were presents more days than none, for the past several weeks. Patient reports she has trouble doing normal day-to-day activities, and sometimes she feels as if life isn't worth living.  Denies any current suicidal thoughts, or negative thoughts to self-harm while on unit. No thoughts to harm others.  At time of interview patient denies SI/SIB/HI and engages in safety planning.    In regards to her anxiety patient reports she experiences daily anxiety with bouts of panic attacks  at 2x weekly (last panic attack was last week).  Reports panic attacks occurs frequently, lasting 15-20 minutes, can last for hours.  She experiences SOB, tightness in his chest, sweating, increased HR, feelings of impending doom. Patient reports she often gets depressed prior to her menstrual cycle.       Patient denies any hx or current AVH, delusions, paranoia, or psychotic disorganization, IOR, or magical thinking.  Patient denies any history or current symptoms of lenard: (no racing thoughts/ increased goal directed activities, impulsive, increased spending, feeling elated, pressured speech, elevated mood, increased in energy level causing sleep disruption).    Patient reports  family history of mental illness in primary degree relative (identifies brother with ADHD/ADD). Denies history of aggression/violence (but then reports she was aggressive with her boyfriend a few weeks ago). No history of arson/fire setting.  No access to firearm. no signs of catatonia. She denies obsessive, intrusive and persistent thoughts or compulsive, ritualistic acts are reported.   Patient reports no legal issues, is not currently under any kind of court supervision. Denies substance abuse, no vaping/tobacco, no alcohol. Admitting tox screen +amphetamine. Pt reports she is in a group for plant medicine and started attending the group for healing. She reports that she recently discovered that the plant medicine used in the group contains MDA, and states that her last use was 1 week ago. Patient denies past sexual trauma. PMH: h/o nasal septopalsty

## 2023-06-23 NOTE — BH INPATIENT PSYCHIATRY PROGRESS NOTE - NSTXDCOPLKDATETRGT_PSY_ALL_CORE
26-Jun-2023
no striae/no voice change/no hirsutism/no heat intolerance/no cold intolerance
26-Jun-2023

## 2023-06-23 NOTE — BH INPATIENT PSYCHIATRY PROGRESS NOTE - NSICDXBHSECONDARYDX_PSY_ALL_CORE
Premenstrual symptom   N94.3  

## 2023-06-23 NOTE — BH INPATIENT PSYCHIATRY PROGRESS NOTE - NSTXSLPPATGOAL_PSY_ALL_CORE
Will be able to identify and utilize 2 sleep hygiene strategies daily

## 2023-06-23 NOTE — BH INPATIENT PSYCHIATRY PROGRESS NOTE - NSBHATTESTBILLING_PSY_A_CORE
73122-Aghomdbxgo OBS or IP - low complexity OR 25-34 mins
54753-Oreirjnzaf OBS or IP - moderate complexity OR 35-49 mins
10015-Xqsuamizxt OBS or IP - high complexity OR 50-79 mins
47242-Uhndnclxut OBS or IP - moderate complexity OR 35-49 mins
79844-Lxbafypbhn OBS or IP - high complexity OR 50-79 mins

## 2023-06-23 NOTE — BH INPATIENT PSYCHIATRY DISCHARGE NOTE - NSDCCPCAREPLAN_GEN_ALL_CORE_FT
PRINCIPAL DISCHARGE DIAGNOSIS  Diagnosis: Bipolar 2 disorder, major depressive episode  Assessment and Plan of Treatment:

## 2023-06-23 NOTE — BH INPATIENT PSYCHIATRY PROGRESS NOTE - NSICDXBHPRIMARYDX_PSY_ALL_CORE
Major depression   F32.9  

## 2023-06-23 NOTE — BH INPATIENT PSYCHIATRY PROGRESS NOTE - NSTXSUPORTINTERMD_PSY_ALL_CORE
Psychopharm with mood stabilization and supportive therapy and appropriate aftercare

## 2023-06-23 NOTE — BH INPATIENT PSYCHIATRY DISCHARGE NOTE - NSDCMRMEDTOKEN_GEN_ALL_CORE_FT
ARIPiprazole 10 mg oral tablet: 1 tab(s) orally once a day (at bedtime)  ARIPiprazole 10 mg oral tablet: 1 tab(s) orally once a day (at bedtime)  ARIPiprazole 10 mg oral tablet: 1 tab(s) orally once a day (at bedtime)  Zoloft 50 mg oral tablet: 1 tab(s) orally once a day (at bedtime) Can be restarted as nightly for mood/anxiety if desired. Dr. ALIRIO RAMIREZ   ARIPiprazole 10 mg oral tablet: 1 tab(s) orally once a day (at bedtime)  ARIPiprazole 10 mg oral tablet: 1 tab(s) orally once a day (at bedtime)  ARIPiprazole 10 mg oral tablet: 1 tab(s) orally once a day (at bedtime)  traZODone 100 mg oral tablet: 1 orally once a day (at bedtime) Can take half or whole or even 1.5 tabs after 2 nights of 50 mg then 2 nights of 100 mg trial.  Zoloft 50 mg oral tablet: 1 tab(s) orally once a day (at bedtime) Can be restarted as nightly for mood/anxiety if desired. Dr. ALIRIO RAMIREZ

## 2023-06-28 RX ORDER — TRAZODONE HCL 50 MG
1 TABLET ORAL
Qty: 30 | Refills: 0
Start: 2023-06-28 | End: 2023-07-27
